# Patient Record
Sex: MALE | Race: WHITE | Employment: FULL TIME | URBAN - METROPOLITAN AREA
[De-identification: names, ages, dates, MRNs, and addresses within clinical notes are randomized per-mention and may not be internally consistent; named-entity substitution may affect disease eponyms.]

---

## 2019-04-15 ENCOUNTER — APPOINTMENT (OUTPATIENT)
Dept: GENERAL RADIOLOGY | Age: 59
End: 2019-04-15

## 2019-04-15 ENCOUNTER — HOSPITAL ENCOUNTER (EMERGENCY)
Age: 59
Discharge: HOME | End: 2019-04-15

## 2019-04-15 VITALS
TEMPERATURE: 97.9 F | HEIGHT: 72 IN | BODY MASS INDEX: 27.09 KG/M2 | RESPIRATION RATE: 16 BRPM | DIASTOLIC BLOOD PRESSURE: 81 MMHG | OXYGEN SATURATION: 100 % | SYSTOLIC BLOOD PRESSURE: 140 MMHG | WEIGHT: 200 LBS | HEART RATE: 82 BPM

## 2019-04-15 DIAGNOSIS — S89.92XA INJURY OF LEFT KNEE, INITIAL ENCOUNTER: ICD-10-CM

## 2019-04-15 DIAGNOSIS — M25.462 KNEE EFFUSION, LEFT: Primary | ICD-10-CM

## 2019-04-15 PROCEDURE — 99283 EMERGENCY DEPT VISIT LOW MDM: CPT

## 2019-04-15 PROCEDURE — 73562 X-RAY EXAM OF KNEE 3: CPT

## 2019-04-15 RX ORDER — IBUPROFEN 600 MG/1
600 TABLET ORAL EVERY 6 HOURS PRN
Qty: 60 TABLET | Refills: 0 | Status: SHIPPED | OUTPATIENT
Start: 2019-04-15

## 2019-04-15 ASSESSMENT — PAIN DESCRIPTION - LOCATION: LOCATION: KNEE

## 2019-04-15 ASSESSMENT — PAIN SCALES - GENERAL: PAINLEVEL_OUTOF10: 10

## 2019-04-15 ASSESSMENT — PAIN DESCRIPTION - ORIENTATION: ORIENTATION: LEFT

## 2019-04-15 ASSESSMENT — PAIN DESCRIPTION - PAIN TYPE: TYPE: ACUTE PAIN

## 2019-04-15 NOTE — ED PROVIDER NOTES
eMERGENCY dEPARTMENT eNCOUnter      PCP: No primary care provider on file. CHIEF COMPLAINT    Chief Complaint   Patient presents with    Knee Pain     hit on metal saturday, pain since. left       HPI    Chico Connors is a 64 y.o. male who presents with Left knee pain. Onset was saturday. The context is he reports a piece of metal fell on it Saturday and then he fell landing on his knee. Denies any other injuries. Pain is localized to left knee. The pain severity is 10/10. The patient has no associated other injuries. The pain is aggravated by ambulation and knee movement. REVIEW OF SYSTEMS    General: Denies fever or chills  Cardiac: Denies chest pain  Pulmonary: Denies shortness of breath  GI: Denies abdominal pain, vomiting, or diarrhea  : No dysuria or hematuria    Denies any other muscles skeletal injuries, including chest wall and back. All other review of systems are negative  See HPI and nursing notes for additional information     [address]    History reviewed. No pertinent past medical history. History reviewed. No pertinent surgical history.     CURRENT MEDICATIONS        ALLERGIES    No Known Allergies    SOCIAL & FAMILY HISTORY    Social History     Socioeconomic History    Marital status:      Spouse name: None    Number of children: None    Years of education: None    Highest education level: None   Occupational History    None   Social Needs    Financial resource strain: None    Food insecurity:     Worry: None     Inability: None    Transportation needs:     Medical: None     Non-medical: None   Tobacco Use    Smoking status: Current Every Day Smoker     Packs/day: 0.50     Types: Cigarettes    Smokeless tobacco: Never Used   Substance and Sexual Activity    Alcohol use: Not Currently    Drug use: Never    Sexual activity: None   Lifestyle    Physical activity:     Days per week: None     Minutes per session: None    Stress: None pm    COMPARISON:  None. HISTORY:  ORDERING SYSTEM PROVIDED HISTORY: hit on metal, pain since  TECHNOLOGIST PROVIDED HISTORY:  Reason for exam:->hit on metal, pain since  Ordering Physician Provided Reason for Exam: hit on metal, pain since  Acuity: Acute  Type of Exam: Initial  Mechanism of Injury: pt states after he injured his knee he fell on it    FINDINGS:  No acute fracture or dislocation is identified.  Prominent chondrocalcinosis  is seen within all 3 compartments.  That said, significant degenerative  disease is not identified. [place] is a moderate to large joint effusion. Vascular calcifications noted. ED COURSE & MEDICAL DECISION MAKING       Vital signs and nursing notes reviewed during ED course. I have independently evaluated this patient . Supervising physican present in the Emergency Department, available for consultation, throughout entirety of  patient care. All pertinent Lab data and radiographic results reviewed with patient at bedside. The patient and/or the family were informed of the treatment plan, and time was allotted to answer questions. Disposition and plan discussed at bedside with patient and/or the family today. History and exam is consistent with joint effusion. I discussed the possibility of internal derangement. Recommended rest, ice, elevation    He'll place an Ace wrap and given crutches for use. Recommend follow-up with orthopedics in the next week. Diagnosis and plan discussed in detail with patient who understands and agrees. Patient agrees to return emergency department if symptoms worsen or any new symptoms develop. Differential diagnosis: includes but not limited to ligamentous injury, tendon injury, soft tissue contusion/hematoma, fracture, dislocation, Infection, Neurologic Deficit/Injury, Meniscus tear, ACL tear, tibial plateau fracture, extensor mechanism rupture, dislocation, Arterial Injury/Ischemia.         Clinical  IMPRESSION    1. Knee effusion, left    2. Injury of left knee, initial encounter            Comment: Please note this report has been produced using speech recognition software and may contain errors related to that system including errors in grammar, punctuation, and spelling, as well as words and phrases that may be inappropriate. If there are any questions or concerns please feel free to contact the dictating provider for clarification.      AMADO Mandujano - KAVON  04/15/19 7071

## 2019-04-15 NOTE — LETTER
[facility] Emergency Department  [address] [id number]  Phone: [phone]  Fax: [phone]               April 15, 2019    Patient: Bereket Belcher   YOB: 1960   Date of Visit: 4/15/2019       To Whom It May Concern:    Дмитрий Morales was seen and treated in our emergency department on 4/15/2019. He may return to work on 4/19/2019.       Sincerely,       AMADO Serrato CNP         Signature:__________________________________

## 2023-01-23 ENCOUNTER — APPOINTMENT (OUTPATIENT)
Dept: CT IMAGING | Age: 63
DRG: 639 | End: 2023-01-23

## 2023-01-23 ENCOUNTER — APPOINTMENT (OUTPATIENT)
Dept: GENERAL RADIOLOGY | Age: 63
DRG: 639 | End: 2023-01-23

## 2023-01-23 ENCOUNTER — HOSPITAL ENCOUNTER (INPATIENT)
Age: 63
LOS: 3 days | Discharge: HOME | DRG: 639 | End: 2023-01-26
Attending: EMERGENCY MEDICINE | Admitting: STUDENT IN AN ORGANIZED HEALTH CARE EDUCATION/TRAINING PROGRAM

## 2023-01-23 DIAGNOSIS — E13.10 DIABETIC KETOSIS (HCC): ICD-10-CM

## 2023-01-23 DIAGNOSIS — E16.2 HYPOGLYCEMIA: ICD-10-CM

## 2023-01-23 DIAGNOSIS — E11.10 DIABETIC KETOACIDOSIS WITHOUT COMA ASSOCIATED WITH TYPE 2 DIABETES MELLITUS (HCC): ICD-10-CM

## 2023-01-23 DIAGNOSIS — E11.9 DIABETES MELLITUS, NEW ONSET (HCC): Primary | ICD-10-CM

## 2023-01-23 DIAGNOSIS — R29.898 LEFT ARM WEAKNESS: ICD-10-CM

## 2023-01-23 DIAGNOSIS — E10.10 DKA, TYPE 1, NOT AT GOAL (HCC): ICD-10-CM

## 2023-01-23 DIAGNOSIS — M62.838 SPASM OF MUSCLE: ICD-10-CM

## 2023-01-23 PROBLEM — R73.9 HYPERGLYCEMIA: Status: ACTIVE | Noted: 2023-01-23

## 2023-01-23 LAB
ALBUMIN SERPL-MCNC: 4.1 GM/DL (ref 3.4–5)
ALP BLD-CCNC: 114 IU/L (ref 40–129)
ALT SERPL-CCNC: 7 U/L (ref 10–40)
ANION GAP SERPL CALCULATED.3IONS-SCNC: 10 MMOL/L (ref 4–16)
ANION GAP SERPL CALCULATED.3IONS-SCNC: 10 MMOL/L (ref 4–16)
ANION GAP SERPL CALCULATED.3IONS-SCNC: 17 MMOL/L (ref 4–16)
AST SERPL-CCNC: 7 IU/L (ref 15–37)
BASE EXCESS: 4 (ref 0–3.3)
BASOPHILS ABSOLUTE: 0.1 K/CU MM
BASOPHILS RELATIVE PERCENT: 0.9 % (ref 0–1)
BETA-HYDROXYBUTYRATE: >20.8 MG/DL (ref 0–3)
BILIRUB SERPL-MCNC: 0.1 MG/DL (ref 0–1)
BUN BLDV-MCNC: 26 MG/DL (ref 6–23)
BUN BLDV-MCNC: 30 MG/DL (ref 6–23)
BUN BLDV-MCNC: 34 MG/DL (ref 6–23)
CALCIUM IONIZED: 4.6 MG/DL (ref 4.48–5.28)
CALCIUM SERPL-MCNC: 8.9 MG/DL (ref 8.3–10.6)
CALCIUM SERPL-MCNC: 9.3 MG/DL (ref 8.3–10.6)
CALCIUM SERPL-MCNC: 9.7 MG/DL (ref 8.3–10.6)
CHLORIDE BLD-SCNC: 102 MMOL/L (ref 99–110)
CHLORIDE BLD-SCNC: 90 MMOL/L (ref 99–110)
CHLORIDE BLD-SCNC: 98 MMOL/L (ref 99–110)
CO2: 18 MMOL/L (ref 21–32)
CO2: 25 MMOL/L (ref 21–32)
CO2: 27 MMOL/L (ref 21–32)
COMMENT: ABNORMAL
CREAT SERPL-MCNC: 1.1 MG/DL (ref 0.9–1.3)
CREAT SERPL-MCNC: 1.2 MG/DL (ref 0.9–1.3)
CREAT SERPL-MCNC: 1.2 MG/DL (ref 0.9–1.3)
DIFFERENTIAL TYPE: ABNORMAL
EKG ATRIAL RATE: 96 BPM
EKG DIAGNOSIS: NORMAL
EKG P AXIS: 37 DEGREES
EKG P-R INTERVAL: 166 MS
EKG Q-T INTERVAL: 346 MS
EKG QRS DURATION: 114 MS
EKG QTC CALCULATION (BAZETT): 437 MS
EKG R AXIS: 45 DEGREES
EKG T AXIS: 25 DEGREES
EKG VENTRICULAR RATE: 96 BPM
EOSINOPHILS ABSOLUTE: 0.1 K/CU MM
EOSINOPHILS RELATIVE PERCENT: 1 % (ref 0–3)
GFR SERPL CREATININE-BSD FRML MDRD: >60 ML/MIN/1.73M2
GLUCOSE BLD-MCNC: 223 MG/DL (ref 70–99)
GLUCOSE BLD-MCNC: 369 MG/DL (ref 70–99)
GLUCOSE BLD-MCNC: 455 MG/DL (ref 70–99)
GLUCOSE BLD-MCNC: 590 MG/DL (ref 70–99)
GLUCOSE BLD-MCNC: 71 MG/DL (ref 70–99)
GLUCOSE BLD-MCNC: 71 MG/DL (ref 70–99)
GLUCOSE BLD-MCNC: 878 MG/DL (ref 70–99)
GLUCOSE BLD-MCNC: >600 MG/DL (ref 70–99)
HCO3 VENOUS: 21.6 MMOL/L (ref 19–25)
HCT VFR BLD CALC: 43.9 % (ref 42–52)
HEMOGLOBIN: 15.3 GM/DL (ref 13.5–18)
IMMATURE NEUTROPHIL %: 1.2 % (ref 0–0.43)
IONIZED CA: 1.15 MMOL/L (ref 1.12–1.32)
LIPASE: 66 IU/L (ref 13–60)
LYMPHOCYTES ABSOLUTE: 1.4 K/CU MM
LYMPHOCYTES RELATIVE PERCENT: 11.5 % (ref 24–44)
MAGNESIUM: 2.2 MG/DL (ref 1.8–2.4)
MCH RBC QN AUTO: 33.3 PG (ref 27–31)
MCHC RBC AUTO-ENTMCNC: 34.9 % (ref 32–36)
MCV RBC AUTO: 95.6 FL (ref 78–100)
MONOCYTES ABSOLUTE: 1.4 K/CU MM
MONOCYTES RELATIVE PERCENT: 11.5 % (ref 0–4)
NUCLEATED RBC %: 0 %
O2 SAT, VEN: 91 % (ref 50–70)
PCO2, VEN: 40 MMHG (ref 38–52)
PDW BLD-RTO: 13.2 % (ref 11.7–14.9)
PH VENOUS: 7.34 (ref 7.32–7.42)
PLATELET # BLD: 244 K/CU MM (ref 140–440)
PMV BLD AUTO: 12.8 FL (ref 7.5–11.1)
PO2, VEN: 140 MMHG (ref 28–48)
POTASSIUM SERPL-SCNC: 4.3 MMOL/L (ref 3.5–5.1)
POTASSIUM SERPL-SCNC: 4.6 MMOL/L (ref 3.5–5.1)
POTASSIUM SERPL-SCNC: 5 MMOL/L (ref 3.5–5.1)
RBC # BLD: 4.59 M/CU MM (ref 4.6–6.2)
REASON FOR REJECTION: NORMAL
REJECTED TEST: NORMAL
SEGMENTED NEUTROPHILS ABSOLUTE COUNT: 8.8 K/CU MM
SEGMENTED NEUTROPHILS RELATIVE PERCENT: 73.9 % (ref 36–66)
SODIUM BLD-SCNC: 125 MMOL/L (ref 135–145)
SODIUM BLD-SCNC: 133 MMOL/L (ref 135–145)
SODIUM BLD-SCNC: 139 MMOL/L (ref 135–145)
TOTAL CK: 61 IU/L (ref 38–174)
TOTAL IMMATURE NEUTOROPHIL: 0.14 K/CU MM
TOTAL NUCLEATED RBC: 0 K/CU MM
TOTAL PROTEIN: 6.7 GM/DL (ref 6.4–8.2)
TROPONIN T: <0.01 NG/ML
TROPONIN T: <0.01 NG/ML
TSH HIGH SENSITIVITY: 1.52 UIU/ML (ref 0.27–4.2)
WBC # BLD: 11.9 K/CU MM (ref 4–10.5)

## 2023-01-23 PROCEDURE — 72125 CT NECK SPINE W/O DYE: CPT

## 2023-01-23 PROCEDURE — 6360000002 HC RX W HCPCS: Performed by: STUDENT IN AN ORGANIZED HEALTH CARE EDUCATION/TRAINING PROGRAM

## 2023-01-23 PROCEDURE — 83036 HEMOGLOBIN GLYCOSYLATED A1C: CPT

## 2023-01-23 PROCEDURE — 84443 ASSAY THYROID STIM HORMONE: CPT

## 2023-01-23 PROCEDURE — 71045 X-RAY EXAM CHEST 1 VIEW: CPT

## 2023-01-23 PROCEDURE — 6360000002 HC RX W HCPCS: Performed by: EMERGENCY MEDICINE

## 2023-01-23 PROCEDURE — 96372 THER/PROPH/DIAG INJ SC/IM: CPT

## 2023-01-23 PROCEDURE — 80053 COMPREHEN METABOLIC PANEL: CPT

## 2023-01-23 PROCEDURE — 99285 EMERGENCY DEPT VISIT HI MDM: CPT

## 2023-01-23 PROCEDURE — 6370000000 HC RX 637 (ALT 250 FOR IP): Performed by: STUDENT IN AN ORGANIZED HEALTH CARE EDUCATION/TRAINING PROGRAM

## 2023-01-23 PROCEDURE — 96374 THER/PROPH/DIAG INJ IV PUSH: CPT

## 2023-01-23 PROCEDURE — 6370000000 HC RX 637 (ALT 250 FOR IP): Performed by: EMERGENCY MEDICINE

## 2023-01-23 PROCEDURE — 84484 ASSAY OF TROPONIN QUANT: CPT

## 2023-01-23 PROCEDURE — 85025 COMPLETE CBC W/AUTO DIFF WBC: CPT

## 2023-01-23 PROCEDURE — 70498 CT ANGIOGRAPHY NECK: CPT

## 2023-01-23 PROCEDURE — 83735 ASSAY OF MAGNESIUM: CPT

## 2023-01-23 PROCEDURE — 6360000004 HC RX CONTRAST MEDICATION

## 2023-01-23 PROCEDURE — 82010 KETONE BODYS QUAN: CPT

## 2023-01-23 PROCEDURE — 93010 ELECTROCARDIOGRAM REPORT: CPT | Performed by: INTERNAL MEDICINE

## 2023-01-23 PROCEDURE — 6370000000 HC RX 637 (ALT 250 FOR IP): Performed by: INTERNAL MEDICINE

## 2023-01-23 PROCEDURE — 83690 ASSAY OF LIPASE: CPT

## 2023-01-23 PROCEDURE — 93005 ELECTROCARDIOGRAM TRACING: CPT | Performed by: EMERGENCY MEDICINE

## 2023-01-23 PROCEDURE — 2580000003 HC RX 258: Performed by: EMERGENCY MEDICINE

## 2023-01-23 PROCEDURE — 36415 COLL VENOUS BLD VENIPUNCTURE: CPT

## 2023-01-23 PROCEDURE — 96375 TX/PRO/DX INJ NEW DRUG ADDON: CPT

## 2023-01-23 PROCEDURE — 87040 BLOOD CULTURE FOR BACTERIA: CPT

## 2023-01-23 PROCEDURE — 2580000003 HC RX 258: Performed by: STUDENT IN AN ORGANIZED HEALTH CARE EDUCATION/TRAINING PROGRAM

## 2023-01-23 PROCEDURE — 82962 GLUCOSE BLOOD TEST: CPT

## 2023-01-23 PROCEDURE — 70450 CT HEAD/BRAIN W/O DYE: CPT

## 2023-01-23 PROCEDURE — 80048 BASIC METABOLIC PNL TOTAL CA: CPT

## 2023-01-23 PROCEDURE — 82330 ASSAY OF CALCIUM: CPT

## 2023-01-23 PROCEDURE — 82550 ASSAY OF CK (CPK): CPT

## 2023-01-23 PROCEDURE — 82805 BLOOD GASES W/O2 SATURATION: CPT

## 2023-01-23 PROCEDURE — 2000000000 HC ICU R&B

## 2023-01-23 RX ORDER — SODIUM CHLORIDE 9 MG/ML
INJECTION, SOLUTION INTRAVENOUS PRN
Status: DISCONTINUED | OUTPATIENT
Start: 2023-01-23 | End: 2023-01-26 | Stop reason: HOSPADM

## 2023-01-23 RX ORDER — INSULIN LISPRO 100 [IU]/ML
0-8 INJECTION, SOLUTION INTRAVENOUS; SUBCUTANEOUS
Status: DISCONTINUED | OUTPATIENT
Start: 2023-01-23 | End: 2023-01-26 | Stop reason: HOSPADM

## 2023-01-23 RX ORDER — TIZANIDINE 4 MG/1
4 TABLET ORAL EVERY 6 HOURS PRN
Status: DISCONTINUED | OUTPATIENT
Start: 2023-01-23 | End: 2023-01-26 | Stop reason: HOSPADM

## 2023-01-23 RX ORDER — POLYETHYLENE GLYCOL 3350 17 G/17G
17 POWDER, FOR SOLUTION ORAL DAILY PRN
Status: CANCELLED | OUTPATIENT
Start: 2023-01-23

## 2023-01-23 RX ORDER — INSULIN LISPRO 100 [IU]/ML
0-4 INJECTION, SOLUTION INTRAVENOUS; SUBCUTANEOUS
Status: DISCONTINUED | OUTPATIENT
Start: 2023-01-23 | End: 2023-01-23

## 2023-01-23 RX ORDER — ENOXAPARIN SODIUM 100 MG/ML
40 INJECTION SUBCUTANEOUS DAILY
Status: DISCONTINUED | OUTPATIENT
Start: 2023-01-23 | End: 2023-01-26 | Stop reason: HOSPADM

## 2023-01-23 RX ORDER — 0.9 % SODIUM CHLORIDE 0.9 %
1000 INTRAVENOUS SOLUTION INTRAVENOUS ONCE
Status: COMPLETED | OUTPATIENT
Start: 2023-01-23 | End: 2023-01-23

## 2023-01-23 RX ORDER — ACETAMINOPHEN 325 MG/1
650 TABLET ORAL EVERY 6 HOURS PRN
Status: CANCELLED | OUTPATIENT
Start: 2023-01-23

## 2023-01-23 RX ORDER — ACETAMINOPHEN 325 MG/1
650 TABLET ORAL EVERY 6 HOURS PRN
Status: DISCONTINUED | OUTPATIENT
Start: 2023-01-23 | End: 2023-01-26 | Stop reason: HOSPADM

## 2023-01-23 RX ORDER — INSULIN GLARGINE 100 [IU]/ML
40 INJECTION, SOLUTION SUBCUTANEOUS NIGHTLY
Status: DISCONTINUED | OUTPATIENT
Start: 2023-01-23 | End: 2023-01-24

## 2023-01-23 RX ORDER — INSULIN LISPRO 100 [IU]/ML
0-4 INJECTION, SOLUTION INTRAVENOUS; SUBCUTANEOUS NIGHTLY
Status: DISCONTINUED | OUTPATIENT
Start: 2023-01-23 | End: 2023-01-23

## 2023-01-23 RX ORDER — MAGNESIUM SULFATE IN WATER 40 MG/ML
2000 INJECTION, SOLUTION INTRAVENOUS PRN
Status: DISCONTINUED | OUTPATIENT
Start: 2023-01-23 | End: 2023-01-26 | Stop reason: HOSPADM

## 2023-01-23 RX ORDER — ACETAMINOPHEN 650 MG/1
650 SUPPOSITORY RECTAL EVERY 6 HOURS PRN
Status: DISCONTINUED | OUTPATIENT
Start: 2023-01-23 | End: 2023-01-26 | Stop reason: HOSPADM

## 2023-01-23 RX ORDER — NAPROXEN SODIUM 220 MG
220 TABLET ORAL EVERY MORNING
Status: ON HOLD | COMMUNITY
End: 2023-01-25 | Stop reason: HOSPADM

## 2023-01-23 RX ORDER — SODIUM CHLORIDE 0.9 % (FLUSH) 0.9 %
5-40 SYRINGE (ML) INJECTION PRN
Status: DISCONTINUED | OUTPATIENT
Start: 2023-01-23 | End: 2023-01-26 | Stop reason: HOSPADM

## 2023-01-23 RX ORDER — SODIUM CHLORIDE 9 MG/ML
INJECTION, SOLUTION INTRAVENOUS PRN
Status: CANCELLED | OUTPATIENT
Start: 2023-01-23

## 2023-01-23 RX ORDER — LORAZEPAM 2 MG/ML
0.5 INJECTION INTRAMUSCULAR ONCE
Status: COMPLETED | OUTPATIENT
Start: 2023-01-23 | End: 2023-01-23

## 2023-01-23 RX ORDER — NICOTINE 21 MG/24HR
1 PATCH, TRANSDERMAL 24 HOURS TRANSDERMAL DAILY
Status: DISCONTINUED | OUTPATIENT
Start: 2023-01-23 | End: 2023-01-26 | Stop reason: HOSPADM

## 2023-01-23 RX ORDER — ONDANSETRON 2 MG/ML
4 INJECTION INTRAMUSCULAR; INTRAVENOUS EVERY 6 HOURS PRN
Status: CANCELLED | OUTPATIENT
Start: 2023-01-23

## 2023-01-23 RX ORDER — INSULIN LISPRO 100 [IU]/ML
15 INJECTION, SOLUTION INTRAVENOUS; SUBCUTANEOUS
Status: DISCONTINUED | OUTPATIENT
Start: 2023-01-23 | End: 2023-01-24

## 2023-01-23 RX ORDER — ACETAMINOPHEN 650 MG/1
650 SUPPOSITORY RECTAL EVERY 6 HOURS PRN
Status: CANCELLED | OUTPATIENT
Start: 2023-01-23

## 2023-01-23 RX ORDER — SODIUM CHLORIDE 0.9 % (FLUSH) 0.9 %
5-40 SYRINGE (ML) INJECTION PRN
Status: CANCELLED | OUTPATIENT
Start: 2023-01-23

## 2023-01-23 RX ORDER — POTASSIUM CHLORIDE 7.45 MG/ML
10 INJECTION INTRAVENOUS PRN
Status: DISCONTINUED | OUTPATIENT
Start: 2023-01-23 | End: 2023-01-26 | Stop reason: HOSPADM

## 2023-01-23 RX ORDER — ONDANSETRON 4 MG/1
4 TABLET, ORALLY DISINTEGRATING ORAL EVERY 8 HOURS PRN
Status: CANCELLED | OUTPATIENT
Start: 2023-01-23

## 2023-01-23 RX ORDER — SODIUM CHLORIDE 0.9 % (FLUSH) 0.9 %
5-40 SYRINGE (ML) INJECTION EVERY 12 HOURS SCHEDULED
Status: DISCONTINUED | OUTPATIENT
Start: 2023-01-23 | End: 2023-01-26 | Stop reason: HOSPADM

## 2023-01-23 RX ORDER — POTASSIUM CHLORIDE 20 MEQ/1
40 TABLET, EXTENDED RELEASE ORAL PRN
Status: DISCONTINUED | OUTPATIENT
Start: 2023-01-23 | End: 2023-01-26 | Stop reason: HOSPADM

## 2023-01-23 RX ORDER — SODIUM CHLORIDE 9 MG/ML
INJECTION, SOLUTION INTRAVENOUS CONTINUOUS
Status: DISCONTINUED | OUTPATIENT
Start: 2023-01-23 | End: 2023-01-25

## 2023-01-23 RX ORDER — SODIUM CHLORIDE 0.9 % (FLUSH) 0.9 %
5-40 SYRINGE (ML) INJECTION EVERY 12 HOURS SCHEDULED
Status: CANCELLED | OUTPATIENT
Start: 2023-01-23

## 2023-01-23 RX ORDER — NICOTINE 21 MG/24HR
1 PATCH, TRANSDERMAL 24 HOURS TRANSDERMAL DAILY
Status: DISCONTINUED | OUTPATIENT
Start: 2023-01-23 | End: 2023-01-23 | Stop reason: SDUPTHER

## 2023-01-23 RX ORDER — ENOXAPARIN SODIUM 100 MG/ML
40 INJECTION SUBCUTANEOUS DAILY
Status: CANCELLED | OUTPATIENT
Start: 2023-01-23

## 2023-01-23 RX ADMIN — ENOXAPARIN SODIUM 40 MG: 100 INJECTION SUBCUTANEOUS at 14:34

## 2023-01-23 RX ADMIN — LORAZEPAM 0.5 MG: 2 INJECTION INTRAMUSCULAR; INTRAVENOUS at 05:43

## 2023-01-23 RX ADMIN — SODIUM CHLORIDE: 9 INJECTION, SOLUTION INTRAVENOUS at 22:36

## 2023-01-23 RX ADMIN — INSULIN LISPRO 2 UNITS: 100 INJECTION, SOLUTION INTRAVENOUS; SUBCUTANEOUS at 17:41

## 2023-01-23 RX ADMIN — INSULIN LISPRO 8 UNITS: 100 INJECTION, SOLUTION INTRAVENOUS; SUBCUTANEOUS at 14:34

## 2023-01-23 RX ADMIN — IOPAMIDOL 75 ML: 755 INJECTION, SOLUTION INTRAVENOUS at 10:46

## 2023-01-23 RX ADMIN — INSULIN HUMAN 10 UNITS: 100 INJECTION, SUSPENSION SUBCUTANEOUS at 14:41

## 2023-01-23 RX ADMIN — SODIUM CHLORIDE 1000 ML: 9 INJECTION, SOLUTION INTRAVENOUS at 09:15

## 2023-01-23 RX ADMIN — Medication 10 ML: at 22:18

## 2023-01-23 RX ADMIN — INSULIN LISPRO 15 UNITS: 100 INJECTION, SOLUTION INTRAVENOUS; SUBCUTANEOUS at 17:41

## 2023-01-23 RX ADMIN — INSULIN HUMAN 5 UNITS: 100 INJECTION, SOLUTION PARENTERAL at 10:01

## 2023-01-23 RX ADMIN — Medication 5 ML: at 12:07

## 2023-01-23 RX ADMIN — INSULIN HUMAN 10 UNITS: 100 INJECTION, SOLUTION PARENTERAL at 10:00

## 2023-01-23 RX ADMIN — SODIUM CHLORIDE: 9 INJECTION, SOLUTION INTRAVENOUS at 14:28

## 2023-01-23 RX ADMIN — INSULIN LISPRO 15 UNITS: 100 INJECTION, SOLUTION INTRAVENOUS; SUBCUTANEOUS at 14:34

## 2023-01-23 ASSESSMENT — LIFESTYLE VARIABLES
HOW OFTEN DO YOU HAVE A DRINK CONTAINING ALCOHOL: NEVER
HOW MANY STANDARD DRINKS CONTAINING ALCOHOL DO YOU HAVE ON A TYPICAL DAY: PATIENT DOES NOT DRINK

## 2023-01-23 ASSESSMENT — ENCOUNTER SYMPTOMS
WHEEZING: 0
NAUSEA: 0
DIARRHEA: 0
SHORTNESS OF BREATH: 0
CONSTIPATION: 0
RHINORRHEA: 0
VOMITING: 0
SORE THROAT: 0
SINUS PRESSURE: 0
ABDOMINAL PAIN: 0
COUGH: 0

## 2023-01-23 ASSESSMENT — PAIN SCALES - GENERAL
PAINLEVEL_OUTOF10: 6
PAINLEVEL_OUTOF10: 0

## 2023-01-23 NOTE — ED NOTES
Report given to [facility] RN and care transferred at this time.       Chato Lam RN  01/23/23 9608

## 2023-01-23 NOTE — ED NOTES
BMP result called to ICU. Intensive care provider does not want pt coming to ICU. Dr. Neema Ramos notified of order change.       Marianna Canales RN  01/23/23 1920

## 2023-01-23 NOTE — H&P
V2.0  History and Physical      Name:  Steven Eckert /Age/Sex: 1960  (63 y.o. male)   MRN & CSN:  [id number] & [id number] Encounter Date/Time: 2023 11:29 AM EST   Location:  ED28/ED-28 [facility] [address] [facility]    Assessment and Plan:   Steven Eckert is a 63 y.o. male with a pmh of  who presents with DKA, type 1, not at goal [facility])    Hospital Problems             Last Modified POA    * (Principal) DKA, type 1, not at goal [facility]) 2023 Yes    Hyperglycemia 2023 Yes       Left arm twitching/spasm/jerking:  Left hand with decreased  strength and numbness:  -No clear previous history or inciting cause  -We will follow infectious work-up  -Neurochecks  -Telemetry  -CT head/CT C-spine/CTA as below  -Neurology consult  -TSH, A1c, lipid panel in a.m. New onset diabetes mellitus with hyperglycemia:  -Patient initial presentation concerning for DKA, however anion gap/bicarb/hyperglycemia improved status post IV fluids. Case discussed with ICU attending and deemed stable for medicine for  -Continue IV NS at 125 mL/h  -A1c  -Endocrine consult  -We will repeat ketones to ensure improvement  -We will repeat BMP to avoid hypokalemia  Risk creatinine level:  -Creatinine 1.2 on admission normal  -Improving status post IV fluids  -Monitor BMP and avoid nephrotoxic medications    Disposition:   Current Living situation: Home w brother  Expected Disposition: Home  Estimated D/C: 2-3 days    Diet ADULT DIET; Regular; 4 carb choices (60 gm/meal)   DVT Prophylaxis [x] Lovenox, []  Heparin, [] SCDs, [] Ambulation,  [] Eliquis, [] Xarelto, [] Coumadin   Code Status Full Code   Surrogate Decision Maker/ POA Self     History from:     patient    History of Present Illness:     Chief Complaint: Left arm twitching, spasms.   Steven Eckert is a 63 y.o. male with no significant past medical history pmh, last follow-up with PCP 2 years back who presents with left arm twitching and spasm for 1 day. Patient reports to be in usual state of health and started experiencing random left arm/hand spasms for 1 day. Patient is also noticed some left hand decreased  strength and numbness for the same duration. Patient denies any previous similar episodes, no chest pain or shortness of breath, no problem with vision/no headaches/no gait issues/no bowel/bladder/appetite or weight changes/no fever chills/sick contacts or new medications. Vital signs stable in the ED. Initial labs with sodium 125, chloride 90, BUN 34/creatinine 1.2, bicarb 18, anion gap 17, glucose 878, lipase 66, beta hydroxybutyrate >20, leukocytosis 11.9k, VBG with pH 7.34/PCO2 40, bicarb 21, CT head unremarkable, CT C-spine with degenerative changes, CTA with1. Severe narrowing at the right vertebral artery origin and mild narrowing at the left vertebral artery origin. 2. Fibrocalcific plaque is noted in bilateral carotid bulbs and proximal internal carotid arteries without evidence of a flow-limiting stenosis. 3. Unremarkable CTA of the brain. 4. Dental caries with periodontal disease. Patient received 1 L normal saline bolus, insulin R 15 units in total, Ativan 0.5 mg.  Repeat BMP with sodium 133, bicarb improved to 25 from 18, creatinine improved to 1.1 from 1.2 and glucose improved to 590 from 878, anion gap resolved 10 from 17. Case was discussed with ICU attending and deemed not an ICU candidate/use for insulin drip at this time. Review of Systems: Need 10 Elements   Review of Systems    Review of Systems:   Constitutional: Negative. Negative for activity change, appetite change, chills, diaphoresis, fatigue, fever and unexpected weight change. HENT: Negative.   Negative for congestion, dental problem, drooling, ear discharge, ear pain, facial swelling, hearing loss, mouth sores, nosebleeds, postnasal drip, rhinorrhea, sinus pressure, sinus pain, sneezing, sore throat, tinnitus, trouble swallowing and voice change. Eyes: Negative. Negative for photophobia, pain, discharge, redness, itching and visual disturbance. Respiratory: Negative. Negative for apnea, cough, choking, chest tightness, shortness of breath, wheezing and stridor. Cardiovascular: Negative. Negative for chest pain and palpitations. Gastrointestinal: Negative. Negative for abdominal distention, abdominal pain, anal bleeding, blood in stool, constipation, diarrhea, nausea, rectal pain and vomiting. Endocrine: Negative. Negative for cold intolerance, heat intolerance, polydipsia, polyphagia and polyuria. Genitourinary: Negative. Negative for decreased urine volume, difficulty urinating, dysuria, enuresis, flank pain, frequency, genital sores, hematuria, penile discharge, penile pain, penile swelling, scrotal swelling, testicular pain and urgency. Musculoskeletal: Negative. Negative for arthralgias, back pain, gait problem, joint swelling, myalgias, neck pain and neck stiffness. Skin: Negative. Negative for color change, pallor, rash and wound. Allergic/Immunologic: Negative for environmental allergies, food allergies and immunocompromised state. Neurological: Left arm spasm, twitching, jerking left weakness and numbness  Hematological: Negative. Negative for adenopathy. Does not bruise/bleed easily. Psychiatric/Behavioral: Negative. Negative for agitation, behavioral problems, confusion, decreased concentration, dysphoric mood, hallucinations, self-injury, sleep disturbance and suicidal ideas. The patient is not nervous/anxious and is not hyperactive.          Objective:   No intake or output data in the 24 hours ending 01/23/23 1129   Vitals:   Vitals:    01/23/23 0603 01/23/23 0733 01/23/23 0901 01/23/23 1003   BP: 103/72 123/77 (!) 136/108 (!) 158/92   Pulse: 92 83 80 72   Resp: 18 27 13 20   Temp:   98.2 °F (36.8 °C)    SpO2: 95% 95% 97% 96%       Medications Prior to Admission     Prior to Admission medications Medication Sig Start Date End Date Taking? Authorizing Provider   naproxen sodium (ALEVE) 220 MG tablet Take 220 mg by mouth every morning   Yes Historical Provider, MD       Physical Exam: Need 8 Elements   Physical Exam     General: NAD  Eyes: EOMI  ENT: neck supple  Cardiovascular: Regular rate. Respiratory: Clear to auscultation  Gastrointestinal: Soft, non tender  Genitourinary: no suprapubic tenderness  Musculoskeletal: No edema  Skin: warm, dry  Neuro: Alert. Psych: Mood appropriate. Past Medical History:   PMHx History reviewed. No pertinent past medical history. PSHX:  has no past surgical history on file. Allergies: No Known Allergies  Fam HX:  family history is not on file.   Soc HX:   Social History     Socioeconomic History    Marital status:      Spouse name: None    Number of children: None    Years of education: None    Highest education level: None   Tobacco Use    Smoking status: Every Day     Packs/day: 0.50     Types: Cigarettes    Smokeless tobacco: Never   Substance and Sexual Activity    Alcohol use: Not Currently    Drug use: Never       Medications:   Medications:    nicotine  1 patch TransDERmal Daily    sodium chloride flush  5-40 mL IntraVENous 2 times per day    enoxaparin  40 mg SubCUTAneous Daily    nicotine  1 patch TransDERmal Daily      Infusions:    sodium chloride       PRN Meds: sodium chloride flush, 5-40 mL, PRN  sodium chloride, , PRN  acetaminophen, 650 mg, Q6H PRN   Or  acetaminophen, 650 mg, Q6H PRN  potassium chloride, 40 mEq, PRN   Or  potassium alternative oral replacement, 40 mEq, PRN   Or  potassium chloride, 10 mEq, PRN  magnesium sulfate, 2,000 mg, PRN  senna, 5 mL, BID PRN        Labs      CBC:   Recent Labs     01/23/23  0532   WBC 11.9*   HGB 15.3        BMP:    Recent Labs     01/23/23  0532 01/23/23  1034   * 133*   K 5.0 4.6   CL 90* 98*   CO2 18* 25   BUN 34* 30*   CREATININE 1.2 1.1   GLUCOSE 878* 590*     Hepatic:   Recent Labs 01/23/23  0532   AST 7*   ALT 7*   BILITOT 0.1   ALKPHOS 114     Lipids: No results found for: CHOL, HDL, TRIG  Hemoglobin A1C: No results found for: LABA1C  TSH: No results found for: TSH  Troponin:   Lab Results   Component Value Date/Time    TROPONINT <0.010 01/23/2023 05:32 AM     Lactic Acid: No results for input(s): LACTA in the last 72 hours. BNP: No results for input(s): PROBNP in the last 72 hours. UA:No results found for: Marzella Handing, PHUR, LABCAST, WBCUA, RBCUA, MUCUS, TRICHOMONAS, YEAST, BACTERIA, CLARITYU, SPECGRAV, LEUKOCYTESUR, UROBILINOGEN, BILIRUBINUR, BLOODU, GLUCOSEU, KETUA, AMORPHOUS  Urine Cultures: No results found for: LABURIN  Blood Cultures: No results found for: BC  No results found for: BLOODCULT2  Organism: No results found for: ORG    Imaging/Diagnostics Last 24 Hours   CT HEAD WO CONTRAST    Result Date: 1/23/2023  EXAMINATION: CT OF THE HEAD WITHOUT CONTRAST; CT OF THE CERVICAL SPINE WITHOUT CONTRAST 1/23/2023 6:04 am TECHNIQUE: CT of the head was performed without the administration of intravenous contrast. Automated exposure control, iterative reconstruction, and/or weight based adjustment of the mA/kV was utilized to reduce the radiation dose to as low as reasonably achievable.; CT of the cervical spine was performed without the administration of intravenous contrast. Multiplanar reformatted images are provided for review. Automated exposure control, iterative reconstruction, and/or weight based adjustment of the mA/kV was utilized to reduce the radiation dose to as low as reasonably achievable. COMPARISON: None. HISTORY: ORDERING SYSTEM PROVIDED HISTORY: left arm spasm TECHNOLOGIST PROVIDED HISTORY: Reason for exam:->left arm spasm Has a \"code stroke\" or \"stroke alert\" been called? ->No Decision Support Exception - unselect if not a suspected or confirmed emergency medical condition->Emergency Medical Condition (MA) Reason for Exam: left arm spasm FINDINGS: BRAIN/VENTRICLES: There is no acute intracranial hemorrhage, mass effect or midline shift. No abnormal extra-axial fluid collection. The gray-white differentiation is maintained without evidence of an acute infarct. There is no evidence of hydrocephalus. ORBITS: The visualized portion of the orbits demonstrate no acute abnormality. SINUSES: The visualized paranasal sinuses and mastoid air cells demonstrate no acute abnormality. SOFT TISSUES/SKULL:  No acute abnormality of the visualized skull or soft tissues. CT cervical spine: There is degenerative change in the cervical spine at multiple levels with decreased disc space height, osteophytosis, and posterior spondylitic ridging. This is most pronounced at C4-5, C5-6, and C6-7. No acute fracture or dislocation is seen. No acute intracranial abnormality. Degenerative change in the cervical spine     CT CERVICAL SPINE WO CONTRAST    Result Date: 1/23/2023  EXAMINATION: CT OF THE HEAD WITHOUT CONTRAST; CT OF THE CERVICAL SPINE WITHOUT CONTRAST 1/23/2023 6:04 am TECHNIQUE: CT of the head was performed without the administration of intravenous contrast. Automated exposure control, iterative reconstruction, and/or weight based adjustment of the mA/kV was utilized to reduce the radiation dose to as low as reasonably achievable.; CT of the cervical spine was performed without the administration of intravenous contrast. Multiplanar reformatted images are provided for review. Automated exposure control, iterative reconstruction, and/or weight based adjustment of the mA/kV was utilized to reduce the radiation dose to as low as reasonably achievable. COMPARISON: None. HISTORY: ORDERING SYSTEM PROVIDED HISTORY: left arm spasm TECHNOLOGIST PROVIDED HISTORY: Reason for exam:->left arm spasm Has a \"code stroke\" or \"stroke alert\" been called? ->No Decision Support Exception - unselect if not a suspected or confirmed emergency medical condition->Emergency Medical Condition (MA) Reason for Exam: left arm spasm FINDINGS: BRAIN/VENTRICLES: There is no acute intracranial hemorrhage, mass effect or midline shift. No abnormal extra-axial fluid collection. The gray-white differentiation is maintained without evidence of an acute infarct. There is no evidence of hydrocephalus. ORBITS: The visualized portion of the orbits demonstrate no acute abnormality. SINUSES: The visualized paranasal sinuses and mastoid air cells demonstrate no acute abnormality. SOFT TISSUES/SKULL:  No acute abnormality of the visualized skull or soft tissues. CT cervical spine: There is degenerative change in the cervical spine at multiple levels with decreased disc space height, osteophytosis, and posterior spondylitic ridging. This is most pronounced at C4-5, C5-6, and C6-7. No acute fracture or dislocation is seen. No acute intracranial abnormality. Degenerative change in the cervical spine     XR CHEST PORTABLE    Result Date: 1/23/2023  EXAMINATION: ONE XRAY VIEW OF THE CHEST 1/23/2023 5:56 am COMPARISON: None. HISTORY: ORDERING SYSTEM PROVIDED HISTORY: left arm pain TECHNOLOGIST PROVIDED HISTORY: Reason for exam:->left arm pain Reason for Exam: Spasms; Muscle Pain Additional signs and symptoms: left arm pain, spasms FINDINGS: There is no consolidation, pleural effusion, or pneumothorax. Calcified granulomas at the left apex and in both lower lungs. Cardiac silhouette is not enlarged and there is no pulmonary vascular congestion. Mediastinum and jersey are within normal limits. Bony thorax is unremarkable. No acute cardiopulmonary process. CTA HEAD NECK W CONTRAST    Result Date: 1/23/2023  EXAMINATION: CTA OF THE HEAD AND NECK WITH CONTRAST 1/23/2023 10:46 am: TECHNIQUE: CTA of the head and neck was performed with the administration of intravenous contrast. Multiplanar reformatted images are provided for review. MIP images are provided for review.  Stenosis of the internal carotid arteries measured using NASCET criteria. Automated exposure control, iterative reconstruction, and/or weight based adjustment of the mA/kV was utilized to reduce the radiation dose to as low as reasonably achievable. COMPARISON: Head CT on 01/23/2023. HISTORY: ORDERING SYSTEM PROVIDED HISTORY: left arm weakness TECHNOLOGIST PROVIDED HISTORY: Reason for exam:->left arm weakness Has a \"code stroke\" or \"stroke alert\" been called? ->No Decision Support Exception - unselect if not a suspected or confirmed emergency medical condition->Emergency Medical Condition (MA) Reason for Exam: left arm weakness Relevant Medical/Surgical History: [id number] FINDINGS: CTA NECK: AORTIC ARCH/ARCH VESSELS: Vascular calcifications are noted along the aortic arch. There is a normal branch pattern of the aortic arch. The innominate and subclavian arteries are patent without evidence of a flow-limiting stenosis. CAROTID ARTERIES: The right common carotid artery is normal in caliber. Fibrocalcific plaque is noted in the right carotid bulb and proximal right internal carotid artery. No flow-limiting stenosis or dissection. There is mild fibrofatty plaque in the left common carotid artery with areas of minimal narrowing. Fibrocalcific plaque is noted in the proximal left internal carotid artery without evidence of a flow-limiting stenosis. VERTEBRAL ARTERIES: There is severe narrowing at the right vertebral artery origin. Mild narrowing at the left vertebral artery origin. No dissection. SOFT TISSUES: There are calcified granulomas in the lungs. No superior mediastinal adenopathy. The thyroid gland is normal in appearance. Submandibular and parotid glands are unremarkable. The parapharyngeal fat spaces are preserved. No cervical or supraclavicular adenopathy. BONES: There are dental caries with periodontal disease. Degenerative changes are noted in the spine. No fracture. CTA HEAD: ANTERIOR CIRCULATION: Vascular calcifications are noted in the supraclinoid ICAs. The left A1 segment is hypoplastic. There is an azygous A2 segment. The middle cerebral arteries are unremarkable. POSTERIOR CIRCULATION: The P-comms are patent. The basilar artery is patent. There is a complete fetal origin of the left posterior cerebral artery. There is a near complete fetal origin of the right posterior cerebral artery. OTHER: No dural venous sinus thrombosis on this non-dedicated study. BRAIN: No areas of abnormal enhancement. No midline shift. 1. Severe narrowing at the right vertebral artery origin and mild narrowing at the left vertebral artery origin. 2. Fibrocalcific plaque is noted in bilateral carotid bulbs and proximal internal carotid arteries without evidence of a flow-limiting stenosis. 3. Unremarkable CTA of the brain. 4. Dental caries with periodontal disease.        Personally reviewed Lab Studies, Imaging    Electronically signed by Yoly Crump MD on 1/23/2023 at 11:29 AM

## 2023-01-23 NOTE — ED NOTES
Patient arrives for complaints of muscle spasms in the left arm that is causing his left hand to contract. Patient states this started Saturday and has increasing gotten worse through the last days. Patient states he only has numbness in the lower hand. Denies starting any new medications or any injuries.  Patient is alert and oriented with steady gait on arrival.     Carola Cervantes RN  01/23/23 5496

## 2023-01-23 NOTE — CONSULTS
Endocrinology   Consult Note  1/23/2023  5:17 AM     Primary Care provider: AMADO Quesada - KAVON     Referring physician:  Sangeeta Herring MD     Dear Doctor    Thank You for the Consult     Pt. Was Admitted for : Left arm twitching and spasms patient was hyperglycemic with possible DKA    Reason for Consult: Better control of blood glucose      History Obtained From:  Patient/ EMR       HISTORY OF PRESENT ILLNESS:                The patient is a 63 y.o. male with significant past medical history of has been in good health until recently when he developed jerky movement of the left arm. And he came to ER for evaluation was found to be hyperglycemic with a blood glucose of 800 and also has positive for beta hydroxybutyric acid and was thought to be DKA. But his anion gap was marginally abnormal.  He is not known to be diabetic. I was  consulted for better in recently diagnosed diabetes mellitus. ROS:   Pt's ROS done in detail. Abnormal ROS are noted in Medical and Surgical History Section below: Other Medical History:    History reviewed. No pertinent past medical history. Surgical History:    History reviewed. No pertinent surgical history. Allergies:  Patient has no known allergies. Family History:   History reviewed. No pertinent family history. REVIEW OF SYSTEMS:  Review of System Done as noted above     PHYSICAL EXAM:      Vitals:    /74   Pulse 66   Temp 97.7 °F (36.5 °C) (Oral)   Resp 16   Ht 6' (1.829 m)   SpO2 99%   BMI 27.12 kg/m²     CONSTITUTIONAL:  awake, alert, cooperative, appears stated age   EYES:  vision intact Fundoscopic Exam not performed   ENT:Normal  NECK:  Supple, No JVD.    Thyroid Exam:Normal   LUNGS:  Has Vesicular Breath Sounds,   CARDIOVASCULAR:  Normal apical impulse, regular rate and rhythm, normal S1 and S2, no S3 or S4, and has no  murmur   ABDOMEN:  No scars, normal bowel sounds, soft, non-distended, non-tender, no masses palpated, no hepatolienomegaly  Musculoskeletal: Normal  Extremities: Normal, peripheral pulses normal, , has no edema   NEUROLOGIC:  Awake, alert, oriented to name, place and time. Cranial nerves II-XII are grossly intact. Motor i left-sided weakness sensory is intact. ,  and gait is normal.    DATA:    CBC:   Recent Labs     01/23/23  0532   WBC 11.9*   HGB 15.3       CMP:  Recent Labs     01/23/23  0532 01/23/23  1034   * 133*   K 5.0 4.6   CL 90* 98*   CO2 18* 25   BUN 34* 30*   CREATININE 1.2 1.1   CALCIUM 9.3 8.9   PROT 6.7  --    LABALBU 4.1  --    BILITOT 0.1  --    ALKPHOS 114  --    AST 7*  --    ALT 7*  --      Lipids: No results found for: CHOL, HDL, TRIG  Glucose:   Recent Labs     01/23/23  1246   POCGLU 455*     Hemoglobin A1C: No results found for: LABA1C  Free T4: No results found for: T4FREE  Free T3: No results found for: FT3  TSH High Sensitivity: No results found for: TSHHS    CTA HEAD NECK W CONTRAST   Final Result   1. Severe narrowing at the right vertebral artery origin and mild narrowing   at the left vertebral artery origin. 2. Fibrocalcific plaque is noted in bilateral carotid bulbs and proximal   internal carotid arteries without evidence of a flow-limiting stenosis. 3. Unremarkable CTA of the brain. 4. Dental caries with periodontal disease. CT CERVICAL SPINE WO CONTRAST   Final Result   No acute intracranial abnormality. Degenerative change in the cervical spine         CT HEAD WO CONTRAST   Final Result   No acute intracranial abnormality. Degenerative change in the cervical spine         XR CHEST PORTABLE   Final Result   No acute cardiopulmonary process.                 Scheduled Medicines   Medications:    nicotine  1 patch TransDERmal Daily    sodium chloride flush  5-40 mL IntraVENous 2 times per day    enoxaparin  40 mg SubCUTAneous Daily    insulin lispro  15 Units SubCUTAneous TID WC    insulin glargine  40 Units SubCUTAneous Nightly    insulin lispro 0-8 Units SubCUTAneous TID     insulin NPH  10 Units SubCUTAneous Once    insulin lispro  0-8 Units SubCUTAneous 2 times per day      Infusions:    sodium chloride      sodium chloride           IMPRESSION    Patient Active Problem List   Diagnosis    DKA, type 1, not at goal (HCC)    Hyperglycemia         RECOMMENDATIONS:      Reviewed POC blood glucose . Labs and X ray results   Reviewed Home and Current Medicines   Will Start On meal/ Correction bolus Humalog/ Lantus Insulin regime   Monitor Blood glucose frequently   Modify  the dose of Insulin as needed        Will follow with you  Again thank you for sharing pt's care with me.     Truly yours,       NICKY Jamison MD

## 2023-01-23 NOTE — ED PROVIDER NOTES
Emergency Department Encounter    Patient: Felicia De La O  MRN: [id number]  : 1960  Date of Evaluation: 2023  ED Provider:  Harsh Bazzi DO    Triage Chief Complaint:   Spasms (Left side, started Saturday.) and Muscle Pain (Left arm)    HPI:  Felicia De La O is a 63 y.o. male with no significant past medical history who presents with spasm of his left upper extremity. He states this has been present for the last 5 days, however it became constant and more severe over the last 48 hours. He does not typically see a physician, and has not been unable to see 1 for this problem. He states that last night he felt as though he could not catch his breath secondary to the spasm, and felt like he was being woken up with the need to gasp for air which prompted him to come into the ED. Patient is not taking any medication for the spasm. He denies any trauma. Denies any neck or back pain. Denies any fever, chills, chest pain, shortness of breath, palpitations, nausea, vomiting, abdominal pain, dysuria, diarrhea. ROS:  Review of Systems   Constitutional:  Negative for chills and fever. HENT:  Negative for congestion, rhinorrhea, sinus pressure and sore throat. Eyes:  Negative for visual disturbance. Respiratory:  Negative for cough, shortness of breath and wheezing. Cardiovascular:  Negative for chest pain and palpitations. Gastrointestinal:  Negative for abdominal pain, constipation, diarrhea, nausea and vomiting. Genitourinary:  Negative for dysuria, frequency and urgency. Musculoskeletal:  Positive for arthralgias and myalgias. Skin:  Negative for rash. Neurological:  Negative for dizziness and syncope. Psychiatric/Behavioral:  Negative for agitation, behavioral problems and confusion. History reviewed. No pertinent past medical history. History reviewed. No pertinent surgical history. History reviewed. No pertinent family history.   Social History Socioeconomic History    Marital status:      Spouse name: Not on file    Number of children: Not on file    Years of education: Not on file    Highest education level: Not on file   Occupational History    Not on file   Tobacco Use    Smoking status: Every Day     Packs/day: 0.50     Types: Cigarettes    Smokeless tobacco: Never   Substance and Sexual Activity    Alcohol use: Not Currently    Drug use: Never    Sexual activity: Not on file   Other Topics Concern    Not on file   Social History Narrative    Not on file     Social Determinants of Health     Financial Resource Strain: Not on file   Food Insecurity: Not on file   Transportation Needs: Not on file   Physical Activity: Not on file   Stress: Not on file   Social Connections: Not on file   Intimate Partner Violence: Not on file   Housing Stability: Not on file     No current facility-administered medications for this encounter. Current Outpatient Medications   Medication Sig Dispense Refill    ibuprofen (ADVIL;MOTRIN) 600 MG tablet Take 1 tablet by mouth every 6 hours as needed for Pain or Fever 60 tablet 0     No Known Allergies    Nursing Notes Reviewed    Physical Exam:  Triage VS:    ED Triage Vitals [01/23/23 0533]   Enc Vitals Group      BP (!) 153/98      Heart Rate 96      Resp 19      Temp 98.2 °F (36.8 °C)      Temp src       SpO2 95 %      Weight       Height       Head Circumference       Peak Flow       Pain Score       Pain Loc       Pain Edu? Excl. in [address]? Physical Exam  Vitals and nursing note reviewed. Constitutional:       General: He is not in acute distress. Appearance: Normal appearance. He is not ill-appearing or toxic-appearing. HENT:      Head: Normocephalic and atraumatic. Nose: Nose normal.      Mouth/Throat:      Mouth: Mucous membranes are moist.   Eyes:      Conjunctiva/sclera: Conjunctivae normal.   Cardiovascular:      Rate and Rhythm: Normal rate and regular rhythm.       Heart sounds: Normal heart sounds. Pulmonary:      Effort: Pulmonary effort is normal. No respiratory distress. Breath sounds: Normal breath sounds. No wheezing, rhonchi or rales. Abdominal:      General: Abdomen is flat. Bowel sounds are normal. There is no distension. Palpations: Abdomen is soft. Tenderness: There is no abdominal tenderness. There is no guarding or rebound. Musculoskeletal:      Comments: Left upper extremity:  Radial pulse and sensation intact. Distal capillary refill intact. Patient with intermittent diffuse spasm in this extremity throughout exam.  Does have episodes where arm completely relaxes. Skin:     General: Skin is warm. Capillary Refill: Capillary refill takes less than 2 seconds. Neurological:      Mental Status: He is alert and oriented to person, place, and time. Mental status is at baseline.    Psychiatric:         Mood and Affect: Mood normal.            I have reviewed and interpreted all of the currently available lab results from this visit (if applicable):  Results for orders placed or performed during the hospital encounter of 01/23/23   CBC with Auto Differential   Result Value Ref Range    WBC 11.9 (H) 4.0 - 10.5 K/CU MM    RBC 4.59 (L) 4.6 - 6.2 M/CU MM    Hemoglobin 15.3 13.5 - 18.0 GM/DL    Hematocrit 43.9 42 - 52 %    MCV 95.6 78 - 100 FL    MCH 33.3 (H) 27 - 31 PG    MCHC 34.9 32.0 - 36.0 %    RDW 13.2 11.7 - 14.9 %    Platelets 403 388 - 132 K/CU MM    MPV 12.8 (H) 7.5 - 11.1 FL    Differential Type AUTOMATED DIFFERENTIAL     Segs Relative 73.9 (H) 36 - 66 %    Lymphocytes % 11.5 (L) 24 - 44 %    Monocytes % 11.5 (H) 0 - 4 %    Eosinophils % 1.0 0 - 3 %    Basophils % 0.9 0 - 1 %    Segs Absolute 8.8 K/CU MM    Lymphocytes Absolute 1.4 K/CU MM    Monocytes Absolute 1.4 K/CU MM    Eosinophils Absolute 0.1 K/CU MM    Basophils Absolute 0.1 K/CU MM    Nucleated RBC % 0.0 %    Total Nucleated RBC 0.0 K/CU MM    Total Immature Neutrophil 0.14 K/CU MM Immature Neutrophil % 1.2 (H) 0 - 0.43 %   CMP   Result Value Ref Range    Sodium 125 (L) 135 - 145 MMOL/L    Potassium 5.0 3.5 - 5.1 MMOL/L    Chloride 90 (L) 99 - 110 mMol/L    CO2 18 (L) 21 - 32 MMOL/L    BUN 34 (H) 6 - 23 MG/DL    Creatinine 1.2 0.9 - 1.3 MG/DL    Est, Glom Filt Rate >60 >60 mL/min/1.73m2    Glucose 878 (HH) 70 - 99 MG/DL    Calcium 9.3 8.3 - 10.6 MG/DL    Albumin 4.1 3.4 - 5.0 GM/DL    Total Protein 6.7 6.4 - 8.2 GM/DL    Total Bilirubin 0.1 0.0 - 1.0 MG/DL    ALT 7 (L) 10 - 40 U/L    AST 7 (L) 15 - 37 IU/L    Alkaline Phosphatase 114 40 - 129 IU/L    Anion Gap 17 (H) 4 - 16   Lipase   Result Value Ref Range    Lipase 66 (H) 13 - 60 IU/L   Magnesium   Result Value Ref Range    Magnesium 2.2 1.8 - 2.4 mg/dl   Troponin   Result Value Ref Range    Troponin T <0.010 <0.01 NG/ML   Calcium, Ionized   Result Value Ref Range    Ionized Ca 1.15 1.12 - 1.32 mMOL/L    Calcium, Ionized 4.60 4.48 - 5.28 MG/DL   CK   Result Value Ref Range    Total CK 61 38 - 174 IU/L   Blood Gas, Venous   Result Value Ref Range    pH, George 7.34 7.32 - 7.42    pCO2, George 40 38 - 52 mmHG    pO2, George 140 (H) 28 - 48 mmHG    Base Excess 4 (H) 0 - 3.3    HCO3, Venous 21.6 19 - 25 MMOL/L    O2 Sat, George 91.0 (H) 50 - 70 %    Comment VBG       Radiographs (if obtained):    [] Radiologist's Report Reviewed:  XR CHEST PORTABLE    (Results Pending)   CT HEAD WO CONTRAST    (Results Pending)   CT CERVICAL SPINE WO CONTRAST    (Results Pending)       Chart review shows recent radiographs:  No results found. MDM:  CC/HPI Summary, DDx, ED Course, and Reassessment:   Patient is a 82-year-old male who presents with spasm of his left upper extremity. Differential diagnosis includes focal seizure, electrolyte abnormality, ACS. Patient with mild leukocytosis of 11.9, otherwise hemoglobin and platelets stable. Sodium 125, however glucose is 878, corrected sodium is 137. Patient does have elevated anion gap of 17 with bicarb of 18.   VBG with pH of 7.34.  Ionized calcium and magnesium within acceptable limits.  BUN, creatinine, ALT, AST, lipase within acceptable limits.  Troponin not detected.  At time of completion of this note, imaging is pending.  Care of patient transferred from me to oncoming physician, Dr. Saleem who agrees to follow-up on imaging and disposition patient appropriately.      Patient was given the following medications:  Medications   LORazepam (ATIVAN) injection 0.5 mg (0.5 mg IntraVENous Given 1/23/23 0543)     EKG (if obtained): (All EKG's are interpreted by myself in the absence of a cardiologist)   Normal sinus rhythm, rate 96, , , QTc 437, no acute ST elevation.        I am the Primary Clinician of Record.       Clinical Impression:  1. Spasm of muscle    2. Hyperglycemia      Disposition referral (if applicable):  No follow-up provider specified.  Disposition medications (if applicable):  New Prescriptions    No medications on file       Comment: Please note this report has been produced using speech recognition software and may contain errors related to that system including errors in grammar, punctuation, and spelling, as well as words and phrases that may be inappropriate.  Efforts were made to edit the dictations.       Allie Hernández,   01/23/23 0701

## 2023-01-23 NOTE — ED NOTES
Medication History  [facility]    Patient Name: Mireya Burgos 1960     Medication history has been completed by: Johnny Jorgensen CPhT    Source(s) of information: patient and wife     Primary Care Physician: AMADO Xiao - CNP     [facility]arm[facility]: [facility]    Allergies as of 01/23/2023    (No Known Allergies)        Prior to Admission medications    Medication Sig Start Date End Date Taking? Authorizing Provider   naproxen sodium (ALEVE) 220 MG tablet Take 220 mg by mouth every morning   Yes Historical Provider, MD     Medications added or changed (ex. new medication, dosage change, interval change, formulation change):  IBU changed to aleve     Comments:  Patient states he takes no prescription medication.   States he takes an Aleve every morning    To my knowledge the above medication history is accurate as of 1/23/2023 10:07 AM.   Johnny Jorgensen CPhT   1/23/2023 10:07 AM

## 2023-01-23 NOTE — ED PROVIDER NOTES
eMERGENCY dEPARTMENT eNCOUnter    ATTENDING SIGN OUT NOTE    HPI/Physical Exam/Medical Decision Making  Time: 06:30 am  I have received sign out of [facility]  [facility] care from Dr. Lima Barber. We discussed the history, physical exam, completed/pending test results (if obtained) and current treatment plan. Please refer to his/her chart for further details. In brief, the patient is a 63 y.o. male who presented to the ED with arm spasms. He states it started 5 days ago. It has been intermittent but has become more frequent. He also tells me his left arm feels weak. He is pending labs and imaging which I am asked to follow-up and disposition the patient. Physical Exam  Vitals: BP (!) 147/86   Pulse 59   Temp 97.5 °F (36.4 °C) (Oral)   Resp 17   Ht 6' (1.829 m)   Wt 188 lb 6.4 oz (85.5 kg)   SpO2 99%   BMI 25.55 kg/m²   On my exam, the patient is afebrile and nontoxic appearing. He is mildly hypertensive on arrival but this resolves. He is otherwise hemodynamically stable. He has a weak left hand grasp. He is otherwise neurologically intact. His left arm frequently flexes at the elbow and his fist draws up to his shoulder region. Airway is patent. Neck is supple. Heart sounds have a regular rate and rhythm. Lungs are clear to auscultation bilaterally. The patient's abdomen is soft, non-tender and non-distended with no peritoneal signs. Extremities are warm, pink, and well perfused. Diagnostics:     CTA HEAD NECK W CONTRAST (Final result)  Result time 01/23/23 11:30:13  Final result by Madison Zamorano MD (01/23/23 11:30:13)                Impression:    1. Severe narrowing at the right vertebral artery origin and mild narrowing   at the left vertebral artery origin. 2. Fibrocalcific plaque is noted in bilateral carotid bulbs and proximal   internal carotid arteries without evidence of a flow-limiting stenosis. 3. Unremarkable CTA of the brain.    4. Dental caries with periodontal disease. Narrative:    EXAMINATION:   CTA OF THE HEAD AND NECK WITH CONTRAST 1/23/2023 10:46 am:     TECHNIQUE:   CTA of the head and neck was performed with the administration of intravenous   contrast. Multiplanar reformatted images are provided for review. MIP images   are provided for review. Stenosis of the internal carotid arteries measured   using NASCET criteria. Automated exposure control, iterative reconstruction,   and/or weight based adjustment of the mA/kV was utilized to reduce the   radiation dose to as low as reasonably achievable. COMPARISON:   Head CT on 01/23/2023. HISTORY:   ORDERING SYSTEM PROVIDED HISTORY: left arm weakness   TECHNOLOGIST PROVIDED HISTORY:   Reason for exam:->left arm weakness   Has a \"code stroke\" or \"stroke alert\" been called? ->No   Decision Support Exception - unselect if not a suspected or confirmed   emergency medical condition->Emergency Medical Condition (MA)   Reason for Exam: left arm weakness   Relevant Medical/Surgical History: [id number]     FINDINGS:     CTA NECK:     AORTIC ARCH/ARCH VESSELS: Vascular calcifications are noted along the aortic   arch. There is a normal branch pattern of the aortic arch. The innominate   and subclavian arteries are patent without evidence of a flow-limiting   stenosis. CAROTID ARTERIES: The right common carotid artery is normal in caliber. Fibrocalcific plaque is noted in the right carotid bulb and proximal right   internal carotid artery. No flow-limiting stenosis or dissection. There is mild fibrofatty plaque in the left common carotid artery with areas   of minimal narrowing. Fibrocalcific plaque is noted in the proximal left   internal carotid artery without evidence of a flow-limiting stenosis. VERTEBRAL ARTERIES: There is severe narrowing at the right vertebral artery   origin. Mild narrowing at the left vertebral artery origin. No dissection.      SOFT TISSUES: There are calcified granulomas in the lungs. No superior   mediastinal adenopathy. The thyroid gland is normal in appearance. Submandibular and parotid glands are unremarkable. The parapharyngeal fat   spaces are preserved. No cervical or supraclavicular adenopathy. BONES: There are dental caries with periodontal disease. Degenerative   changes are noted in the spine. No fracture. CTA HEAD:     ANTERIOR CIRCULATION: Vascular calcifications are noted in the supraclinoid   ICAs. The left A1 segment is hypoplastic. There is an azygous A2 segment. The middle cerebral arteries are unremarkable. POSTERIOR CIRCULATION: The P-comms are patent. The basilar artery is patent. There is a complete fetal origin of the left posterior cerebral artery. There is a near complete fetal origin of the right posterior cerebral artery. OTHER: No dural venous sinus thrombosis on this non-dedicated study. BRAIN: No areas of abnormal enhancement. No midline shift. CT CERVICAL SPINE WO CONTRAST (Final result)  Result time 01/23/23 07:18:25  Final result by Bianka Oropeza MD (01/23/23 07:18:25)                Impression:    No acute intracranial abnormality. Degenerative change in the cervical spine             Narrative:    EXAMINATION:   CT OF THE HEAD WITHOUT CONTRAST; CT OF THE CERVICAL SPINE WITHOUT CONTRAST   1/23/2023 6:04 am     TECHNIQUE:   CT of the head was performed without the administration of intravenous   contrast. Automated exposure control, iterative reconstruction, and/or weight   based adjustment of the mA/kV was utilized to reduce the radiation dose to as   low as reasonably achievable.; CT of the cervical spine was performed without   the administration of intravenous contrast. Multiplanar reformatted images   are provided for review.  Automated exposure control, iterative   reconstruction, and/or weight based adjustment of the mA/kV was utilized to   reduce the radiation dose to as low as reasonably achievable. COMPARISON:   None. HISTORY:   ORDERING SYSTEM PROVIDED HISTORY: left arm spasm   TECHNOLOGIST PROVIDED HISTORY:   Reason for exam:->left arm spasm   Has a \"code stroke\" or \"stroke alert\" been called? ->No   Decision Support Exception - unselect if not a suspected or confirmed   emergency medical condition->Emergency Medical Condition (MA)   Reason for Exam: left arm spasm     FINDINGS:   BRAIN/VENTRICLES: There is no acute intracranial hemorrhage, mass effect or   midline shift. No abnormal extra-axial fluid collection. The gray-white   differentiation is maintained without evidence of an acute infarct. There is   no evidence of hydrocephalus. ORBITS: The visualized portion of the orbits demonstrate no acute abnormality. SINUSES: The visualized paranasal sinuses and mastoid air cells demonstrate   no acute abnormality. SOFT TISSUES/SKULL:  No acute abnormality of the visualized skull or soft   tissues. CT cervical spine: There is degenerative change in the cervical spine at   multiple levels with decreased disc space height, osteophytosis, and   posterior spondylitic ridging. This is most pronounced at C4-5, C5-6, and   C6-7. No acute fracture or dislocation is seen. CT HEAD WO CONTRAST (Final result)  Result time 01/23/23 07:18:25  Final result by Harini Benavides MD (01/23/23 07:18:25)                Impression:    No acute intracranial abnormality.      Degenerative change in the cervical spine             Narrative:    EXAMINATION:   CT OF THE HEAD WITHOUT CONTRAST; CT OF THE CERVICAL SPINE WITHOUT CONTRAST   1/23/2023 6:04 am     TECHNIQUE:   CT of the head was performed without the administration of intravenous   contrast. Automated exposure control, iterative reconstruction, and/or weight   based adjustment of the mA/kV was utilized to reduce the radiation dose to as   low as reasonably achievable.; CT of the cervical spine was performed without   the administration of intravenous contrast. Multiplanar reformatted images   are provided for review. Automated exposure control, iterative   reconstruction, and/or weight based adjustment of the mA/kV was utilized to   reduce the radiation dose to as low as reasonably achievable. COMPARISON:   None. HISTORY:   ORDERING SYSTEM PROVIDED HISTORY: left arm spasm   TECHNOLOGIST PROVIDED HISTORY:   Reason for exam:->left arm spasm   Has a \"code stroke\" or \"stroke alert\" been called? ->No   Decision Support Exception - unselect if not a suspected or confirmed   emergency medical condition->Emergency Medical Condition (MA)   Reason for Exam: left arm spasm     FINDINGS:   BRAIN/VENTRICLES: There is no acute intracranial hemorrhage, mass effect or   midline shift. No abnormal extra-axial fluid collection. The gray-white   differentiation is maintained without evidence of an acute infarct. There is   no evidence of hydrocephalus. ORBITS: The visualized portion of the orbits demonstrate no acute abnormality. SINUSES: The visualized paranasal sinuses and mastoid air cells demonstrate   no acute abnormality. SOFT TISSUES/SKULL:  No acute abnormality of the visualized skull or soft   tissues. CT cervical spine: There is degenerative change in the cervical spine at   multiple levels with decreased disc space height, osteophytosis, and   posterior spondylitic ridging. This is most pronounced at C4-5, C5-6, and   C6-7. No acute fracture or dislocation is seen. XR CHEST PORTABLE (Final result)  Result time 01/23/23 07:03:56  Final result by Evangelina Garces MD (01/23/23 07:03:56)                Impression:    No acute cardiopulmonary process. Narrative:    EXAMINATION:   ONE XRAY VIEW OF THE CHEST     1/23/2023 5:56 am     COMPARISON:   None.      HISTORY:   ORDERING SYSTEM PROVIDED HISTORY: left arm pain   TECHNOLOGIST PROVIDED HISTORY:   Reason for exam:->left arm pain   Reason for Exam: Spasms; Muscle Pain   Additional signs and symptoms: left arm pain, spasms     FINDINGS:   There is no consolidation, pleural effusion, or pneumothorax. Calcified   granulomas at the left apex and in both lower lungs. Cardiac silhouette is   not enlarged and there is no pulmonary vascular congestion. Mediastinum and   jersey are within normal limits. Bony thorax is unremarkable. Labs Reviewed   CBC WITH AUTO DIFFERENTIAL - Abnormal; Notable for the following components:       Result Value    WBC 11.9 (*)     RBC 4.59 (*)     MCH 33.3 (*)     MPV 12.8 (*)     Segs Relative 73.9 (*)     Lymphocytes % 11.5 (*)     Monocytes % 11.5 (*)     Immature Neutrophil % 1.2 (*)     All other components within normal limits   COMPREHENSIVE METABOLIC PANEL - Abnormal; Notable for the following components:    Sodium 125 (*)     Chloride 90 (*)     CO2 18 (*)     BUN 34 (*)     Glucose 878 (*)     ALT 7 (*)     AST 7 (*)     Anion Gap 17 (*)     All other components within normal limits   LIPASE - Abnormal; Notable for the following components:    Lipase 66 (*)     All other components within normal limits   BETA-HYDROXYBUTYRATE - Abnormal; Notable for the following components:    Beta-Hydroxybutyrate >20.8 (*)     All other components within normal limits   BLOOD GAS, VENOUS - Abnormal; Notable for the following components:    pO2, George 140 (*)     Base Excess 4 (*)     O2 Sat, George 91.0 (*)     All other components within normal limits   BASIC METABOLIC PANEL - Abnormal; Notable for the following components:    Sodium 133 (*)     Chloride 98 (*)     BUN 30 (*)     Glucose 590 (*)     All other components within normal limits       ED COURSE & MEDICAL DECISION MAKING:  Nickolas Lilly is a 63 y.o. male who presents to the Emergency Department complaining of left arm weakness and spasms. Please see HPI for details.     History from: Patient    Limitations to history: None    Chronic conditions affecting care: None    Social Determinants[de-identified] None    Records Reviewed: None    On exam, the patient is afebrile and nontoxic appearing. He is mildly hypertensive on arrival but this resolves. He is otherwise hemodynamically stable. He has a weak left hand grasp. He is otherwise neurologically intact. Labs are obtained and are significant for upper glycemia with a glucose of 878, and elevated anion gap of 17, decreased bicarb of 18, pseudohyponatremia, a lipase that is elevated at 66, mild leukocytosis with a left shift, beta hydroxybutyrate greater than 20, and a venous blood gas that shows a normal venous pH of 7.34 and a normal PCO2 of 40. CT head without contrast is negative for acute intracranial abnormality. CT cervical spine shows degenerative change in the cervical spine. CTA of the head and neck shows severe narrowing at the right vertebral artery origin and mild narrowing at the left vertebral artery origin. Fibrocalcific plaque is noted in bilateral carotid bulbs and proximal internal carotid arteries without evidence of a flow-limiting stenosis. CT of the brain is unremarkable. There are dental caries with periodontal disease. The patient was treated with 0.5 mg of Ativan, 1 L of IV normal saline, nicotine patch, 10 units of subcutaneous insulin and 5 units of IV insulin. Diagnosis and Differential Diagnosis:  I suspect that the patient has new onset diabetes with diabetic ketosis without acidosis. I have a low suspicion for DKA, nonketotic, hyperosmolar hyperglycemia, acute CVA, intracranial hemorrhage, brain mass or electrolyte derangement. Disposition Considerations (tests considered but not done, Shared Decision Making, Pt Expectation of Test or Tx.):   I recommended admission to the hospital and the patient was agreeable. I discussed the case with the hospitalist who spoke with the ICU physician who was planning to admit the patient. However, he asked to have a repeat BMP after the fluids and insulin were given which show marked improvement with resolution of the elevated anion gap from 17 down to 10 and increased bicarb of 25. Glucose improved to 590. The patient does not require the ICU at this time and will be admitted to the hospitalist who is updated and agreeable to admit. I am the Primary Clinician of Record. Clinical Impression:  1. Diabetes mellitus, new onset ([address]    2. Spasm of muscle    3. Left arm weakness    4. Hypoglycemia    5. Diabetic ketosis ([address]        Comment: Please note this report has been produced using speech recognition software and may contain errors related to that system including errors in grammar, punctuation, and spelling, as well as words and phrases that may be inappropriate. If there are any questions or concerns please feel free to contact the dictating provider for clarification.                  Jose Ivy MD  01/24/23 7932

## 2023-01-23 NOTE — CARE COORDINATION
MCG criteria for hyperglycemia  reviewed at this time, criteria supports Inpatient Admission.  SUSY,RN/CM

## 2023-01-23 NOTE — ED NOTES
ED TO INPATIENT SBAR HANDOFF    Patient Name: Veronica Galindo   :  1960  63 y.o. MRN:  [id number]  Preferred Name  Olga Apodaca  ED Room #:  ED28/ED-28  Family/Caregiver Present yes   Restraints no   Sitter no   Sepsis Risk Score Sepsis Risk Score: 0.87    Situation  Code Status: No Order No additional code details. Allergies: Patient has no known allergies. Weight: No data found. Arrived from: home  Chief Complaint:   Chief Complaint   Patient presents with    Spasms     Left side, started Saturday. Muscle Pain     Left arm     Hospital Problem/Diagnosis:  Active Problems:    * No active hospital problems. *  Resolved Problems:    * No resolved hospital problems. *    Imaging:   CT CERVICAL SPINE WO CONTRAST   Final Result   No acute intracranial abnormality. Degenerative change in the cervical spine         CT HEAD WO CONTRAST   Final Result   No acute intracranial abnormality. Degenerative change in the cervical spine         XR CHEST PORTABLE   Final Result   No acute cardiopulmonary process.            Abnormal labs:   Abnormal Labs Reviewed   CBC WITH AUTO DIFFERENTIAL - Abnormal; Notable for the following components:       Result Value    WBC 11.9 (*)     RBC 4.59 (*)     MCH 33.3 (*)     MPV 12.8 (*)     Segs Relative 73.9 (*)     Lymphocytes % 11.5 (*)     Monocytes % 11.5 (*)     Immature Neutrophil % 1.2 (*)     All other components within normal limits   COMPREHENSIVE METABOLIC PANEL - Abnormal; Notable for the following components:    Sodium 125 (*)     Chloride 90 (*)     CO2 18 (*)     BUN 34 (*)     Glucose 878 (*)     ALT 7 (*)     AST 7 (*)     Anion Gap 17 (*)     All other components within normal limits   LIPASE - Abnormal; Notable for the following components:    Lipase 66 (*)     All other components within normal limits   BETA-HYDROXYBUTYRATE - Abnormal; Notable for the following components:    Beta-Hydroxybutyrate >20.8 (*)     All other components within normal limits BLOOD GAS, VENOUS - Abnormal; Notable for the following components:    pO2, George 140 (*)     Base Excess 4 (*)     O2 Sat, George 91.0 (*)     All other components within normal limits     Critical values: yes     Abnormal Assessment Findings:     Background  History: History reviewed. No pertinent past medical history. Assessment    Vitals/MEWS: MEWS Score: 0  Level of Consciousness: Alert (0)   Vitals:    01/23/23 0533 01/23/23 0603 01/23/23 0733 01/23/23 0901   BP: (!) 153/98 103/72 123/77 (!) 136/108   Pulse: 96 92 83 80   Resp: 19 18 27 13   Temp: 98.2 °F (36.8 °C)   98.2 °F (36.8 °C)   SpO2: 95% 95% 95% 97%     FiO2 (%): n/a  O2 Flow Rate:      Cardiac Rhythm:   Pain Assessment:  [x] Verbal [] Yung Countess Scale  Pain Scale: Pain Assessment  Pain Assessment: 0-10  Pain Level: 6  Last documented pain score (0-10 scale) Pain Level: 6  Last documented pain medication administered: none  Mental Status: oriented, alert, coherent, logical, thought processes intact, and able to concentrate and follow conversation  NIH Score: NIH     C-SSRS: Risk of Suicide: No Risk  Bedside swallow:    [place] Coma Scale (GCS): Sana Coma Scale  Eye Opening: Spontaneous  Best Verbal Response: Oriented  Best Motor Response: Obeys commands  Sana Coma Scale Score: 15  Active LDA's:   Peripheral IV 01/23/23 Right Forearm (Active)     PO Status: Regular  Pertinent or High Risk Medications/Drips: no   If Yes, please provide details: n/a  Pending Blood Product Administration: no     You may also review the ED PT Care Timeline found under the Summary Nursing Index tab. Recommendation    Pending orders admit orders  Plan for Discharge (if known): Additional Comments: pt from home. Takes meds whole.     If any further questions, please call Sending RN at [phone]    Electronically signed by: Electronically signed by Leana Zimmer RN on 1/23/2023 at 9:08 AM      Leana Zimmer, [facility]  01/23/23 3231

## 2023-01-24 ENCOUNTER — APPOINTMENT (OUTPATIENT)
Dept: MRI IMAGING | Age: 63
DRG: 639 | End: 2023-01-24

## 2023-01-24 LAB
ALBUMIN SERPL-MCNC: 3.2 GM/DL (ref 3.4–5)
ALP BLD-CCNC: 63 IU/L (ref 40–128)
ALT SERPL-CCNC: 9 U/L (ref 10–40)
ANION GAP SERPL CALCULATED.3IONS-SCNC: 9 MMOL/L (ref 4–16)
AST SERPL-CCNC: 10 IU/L (ref 15–37)
BASOPHILS ABSOLUTE: 0.1 K/CU MM
BASOPHILS RELATIVE PERCENT: 0.9 % (ref 0–1)
BILIRUB SERPL-MCNC: 0.2 MG/DL (ref 0–1)
BUN BLDV-MCNC: 24 MG/DL (ref 6–23)
CALCIUM SERPL-MCNC: 8.7 MG/DL (ref 8.3–10.6)
CHLORIDE BLD-SCNC: 108 MMOL/L (ref 99–110)
CHOLESTEROL: 141 MG/DL
CO2: 23 MMOL/L (ref 21–32)
CREAT SERPL-MCNC: 1 MG/DL (ref 0.9–1.3)
DIFFERENTIAL TYPE: ABNORMAL
EOSINOPHILS ABSOLUTE: 0.1 K/CU MM
EOSINOPHILS RELATIVE PERCENT: 1.2 % (ref 0–3)
GFR SERPL CREATININE-BSD FRML MDRD: >60 ML/MIN/1.73M2
GLUCOSE BLD-MCNC: 157 MG/DL (ref 70–99)
GLUCOSE BLD-MCNC: 164 MG/DL (ref 70–99)
GLUCOSE BLD-MCNC: 216 MG/DL (ref 70–99)
GLUCOSE BLD-MCNC: 239 MG/DL (ref 70–99)
GLUCOSE BLD-MCNC: 273 MG/DL (ref 70–99)
GLUCOSE BLD-MCNC: 87 MG/DL (ref 70–99)
HCT VFR BLD CALC: 36.5 % (ref 42–52)
HDLC SERPL-MCNC: 25 MG/DL
HEMOGLOBIN: 12.5 GM/DL (ref 13.5–18)
IMMATURE NEUTROPHIL %: 0.6 % (ref 0–0.43)
LDL CHOLESTEROL CALCULATED: 62 MG/DL
LYMPHOCYTES ABSOLUTE: 3.2 K/CU MM
LYMPHOCYTES RELATIVE PERCENT: 27 % (ref 24–44)
MCH RBC QN AUTO: 32.9 PG (ref 27–31)
MCHC RBC AUTO-ENTMCNC: 34.2 % (ref 32–36)
MCV RBC AUTO: 96.1 FL (ref 78–100)
MONOCYTES ABSOLUTE: 1.2 K/CU MM
MONOCYTES RELATIVE PERCENT: 10.2 % (ref 0–4)
NUCLEATED RBC %: 0 %
PDW BLD-RTO: 13.5 % (ref 11.7–14.9)
PLATELET # BLD: 206 K/CU MM (ref 140–440)
PMV BLD AUTO: 12.2 FL (ref 7.5–11.1)
POTASSIUM SERPL-SCNC: 4.4 MMOL/L (ref 3.5–5.1)
RBC # BLD: 3.8 M/CU MM (ref 4.6–6.2)
SEGMENTED NEUTROPHILS ABSOLUTE COUNT: 7.1 K/CU MM
SEGMENTED NEUTROPHILS RELATIVE PERCENT: 60.1 % (ref 36–66)
SODIUM BLD-SCNC: 140 MMOL/L (ref 135–145)
TOTAL IMMATURE NEUTOROPHIL: 0.07 K/CU MM
TOTAL NUCLEATED RBC: 0 K/CU MM
TOTAL PROTEIN: 5.2 GM/DL (ref 6.4–8.2)
TOTAL RETICULOCYTE COUNT: 0.07 K/CU MM
TRIGL SERPL-MCNC: 268 MG/DL
WBC # BLD: 11.7 K/CU MM (ref 4–10.5)

## 2023-01-24 PROCEDURE — 94761 N-INVAS EAR/PLS OXIMETRY MLT: CPT

## 2023-01-24 PROCEDURE — 99255 IP/OBS CONSLTJ NEW/EST HI 80: CPT | Performed by: PSYCHIATRY & NEUROLOGY

## 2023-01-24 PROCEDURE — 6360000002 HC RX W HCPCS: Performed by: STUDENT IN AN ORGANIZED HEALTH CARE EDUCATION/TRAINING PROGRAM

## 2023-01-24 PROCEDURE — 6370000000 HC RX 637 (ALT 250 FOR IP): Performed by: EMERGENCY MEDICINE

## 2023-01-24 PROCEDURE — 80061 LIPID PANEL: CPT

## 2023-01-24 PROCEDURE — 6370000000 HC RX 637 (ALT 250 FOR IP): Performed by: INTERNAL MEDICINE

## 2023-01-24 PROCEDURE — 6370000000 HC RX 637 (ALT 250 FOR IP): Performed by: STUDENT IN AN ORGANIZED HEALTH CARE EDUCATION/TRAINING PROGRAM

## 2023-01-24 PROCEDURE — 70551 MRI BRAIN STEM W/O DYE: CPT

## 2023-01-24 PROCEDURE — 1200000000 HC SEMI PRIVATE

## 2023-01-24 PROCEDURE — 85025 COMPLETE CBC W/AUTO DIFF WBC: CPT

## 2023-01-24 PROCEDURE — 2580000003 HC RX 258: Performed by: STUDENT IN AN ORGANIZED HEALTH CARE EDUCATION/TRAINING PROGRAM

## 2023-01-24 PROCEDURE — 80053 COMPREHEN METABOLIC PANEL: CPT

## 2023-01-24 RX ORDER — INSULIN LISPRO 100 [IU]/ML
10 INJECTION, SOLUTION INTRAVENOUS; SUBCUTANEOUS
Status: DISCONTINUED | OUTPATIENT
Start: 2023-01-24 | End: 2023-01-26 | Stop reason: HOSPADM

## 2023-01-24 RX ORDER — ROSUVASTATIN CALCIUM 20 MG/1
40 TABLET, COATED ORAL NIGHTLY
Status: DISCONTINUED | OUTPATIENT
Start: 2023-01-24 | End: 2023-01-26 | Stop reason: HOSPADM

## 2023-01-24 RX ORDER — INSULIN GLARGINE 100 [IU]/ML
30 INJECTION, SOLUTION SUBCUTANEOUS NIGHTLY
Status: DISCONTINUED | OUTPATIENT
Start: 2023-01-24 | End: 2023-01-26 | Stop reason: HOSPADM

## 2023-01-24 RX ADMIN — TIZANIDINE 4 MG: 4 TABLET ORAL at 21:07

## 2023-01-24 RX ADMIN — INSULIN LISPRO 4 UNITS: 100 INJECTION, SOLUTION INTRAVENOUS; SUBCUTANEOUS at 20:59

## 2023-01-24 RX ADMIN — INSULIN LISPRO 10 UNITS: 100 INJECTION, SOLUTION INTRAVENOUS; SUBCUTANEOUS at 08:26

## 2023-01-24 RX ADMIN — TIZANIDINE 4 MG: 4 TABLET ORAL at 02:09

## 2023-01-24 RX ADMIN — ROSUVASTATIN CALCIUM 40 MG: 20 TABLET, COATED ORAL at 20:59

## 2023-01-24 RX ADMIN — SODIUM CHLORIDE: 9 INJECTION, SOLUTION INTRAVENOUS at 14:50

## 2023-01-24 RX ADMIN — INSULIN GLARGINE 30 UNITS: 100 INJECTION, SOLUTION SUBCUTANEOUS at 20:59

## 2023-01-24 RX ADMIN — Medication 5 ML: at 22:05

## 2023-01-24 RX ADMIN — INSULIN LISPRO 2 UNITS: 100 INJECTION, SOLUTION INTRAVENOUS; SUBCUTANEOUS at 08:26

## 2023-01-24 RX ADMIN — INSULIN LISPRO 2 UNITS: 100 INJECTION, SOLUTION INTRAVENOUS; SUBCUTANEOUS at 12:42

## 2023-01-24 RX ADMIN — ENOXAPARIN SODIUM 40 MG: 100 INJECTION SUBCUTANEOUS at 08:26

## 2023-01-24 RX ADMIN — SODIUM CHLORIDE: 9 INJECTION, SOLUTION INTRAVENOUS at 06:28

## 2023-01-24 RX ADMIN — INSULIN LISPRO 10 UNITS: 100 INJECTION, SOLUTION INTRAVENOUS; SUBCUTANEOUS at 12:43

## 2023-01-24 ASSESSMENT — ENCOUNTER SYMPTOMS
CONSTIPATION: 0
ABDOMINAL PAIN: 0
WHEEZING: 0
EYE PAIN: 0
EYE REDNESS: 0
VOMITING: 0
COUGH: 0
SINUS PAIN: 0
NAUSEA: 0
DIARRHEA: 0
BACK PAIN: 0
SHORTNESS OF BREATH: 0
EYE ITCHING: 0
SORE THROAT: 0
CHEST TIGHTNESS: 0
SINUS PRESSURE: 0

## 2023-01-24 ASSESSMENT — PAIN SCALES - GENERAL
PAINLEVEL_OUTOF10: 0
PAINLEVEL_OUTOF10: 6

## 2023-01-24 ASSESSMENT — PAIN DESCRIPTION - LOCATION: LOCATION: WRIST

## 2023-01-24 NOTE — CONSULTS
Neurology Service Consult Note  [address]   Patient Name: Lalo Patricio  : 1960        Subjective:   Reason for consult: Left arm spasm, jerking, weakness and numbness for 1 day  63 y.o. right-handed male with past medical history unknown as he does not follow with PCP presenting to [place] [id number] with left upper extremity spasm over the course of the last week that worsened over the last several days. Worsening is described as more constant. He reported that he felt he couldn't catch is breath because of the spasm. Patient also reports decreased  strength and numbness in the left upper extremity. Initial labs noted hyponatremia, BUN 34, creatning 1.2, bicarb 18, gap, 17, glucose 878. CTA head and neck noted severe narrowing at the right vertebral artery origin and mild narrowing at the left vertebral artery origin. Hyperglycemia is being managed by endocrinology. Neurology has been asked to evaluate the left upper extremity spasm/weakness. Chart was reviewed in detail. Patient was seen and assessed. He describes that he has been having ongoing spasms to his left hand that he describes as a clenching and twisting of his hand and fingers. He states he has to physically straighten his fingers out at times. This has been going on for years. Symptoms have been worsening though recently and now extending up into the left arm and is associated with jerking of the left arm. Patient states that his symptoms have decreased in frequency since he has been at the hospital.  He is only had 1 or 2 episodes today. There were no events noted while exam was going on. Patient denies any headache or vision changes. He does confirm that he has some clumsiness or difficulty manipulating small items with his left hand. He also states that he feels like his left hand is swollen. Patient lives at home and he is independent in his ADLs.   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 He is not been seen by a physician in many years for any type of primary care. History reviewed. No pertinent past medical history. :   History reviewed. No pertinent surgical history. Medications:  Scheduled Meds:   insulin glargine  30 Units SubCUTAneous Nightly    insulin lispro  10 Units SubCUTAneous TID WC    nicotine  1 patch TransDERmal Daily    sodium chloride flush  5-40 mL IntraVENous 2 times per day    enoxaparin  40 mg SubCUTAneous Daily    insulin lispro  0-8 Units SubCUTAneous TID WC    insulin lispro  0-8 Units SubCUTAneous 2 times per day     Continuous Infusions:   sodium chloride      sodium chloride 125 mL/hr at 01/24/23 0628     PRN Meds:.sodium chloride flush, sodium chloride, acetaminophen **OR** acetaminophen, potassium chloride **OR** potassium alternative oral replacement **OR** potassium chloride, magnesium sulfate, sennosides, tiZANidine    No Known Allergies  Social History     Socioeconomic History    Marital status:      Spouse name: Not on file    Number of children: Not on file    Years of education: Not on file    Highest education level: Not on file   Occupational History    Not on file   Tobacco Use    Smoking status: Every Day     Packs/day: 0.50     Types: Cigarettes    Smokeless tobacco: Never   Substance and Sexual Activity    Alcohol use: Not Currently    Drug use: Never    Sexual activity: Not on file   Other Topics Concern    Not on file   Social History Narrative    Not on file     Social Determinants of Health     Financial Resource Strain: Not on file   Food Insecurity: Not on file   Transportation Needs: Not on file   Physical Activity: Not on file   Stress: Not on file   Social Connections: Not on file   Intimate Partner Violence: Not on file   Housing Stability: Not on file      History reviewed. No pertinent family history.       ROS (10 systems)  In addition to that documented in the HPI above, the additional ROS was obtained:  Constitutional: Denies fevers or chills  Eyes: Denies vision changes  ENMT: Denies sore throat  CV: Denies chest pain  Resp: Denies SOB  GI: Denies vomiting or diarrhea  : Denies painful urination  MSK: Denies recent trauma  Skin: Denies new rashes  Neuro: Cramping, spasms and loss of dexterity in the left upper extremity  Endocrine: Denies unexpected weight loss  Heme: Denies bleeding disorders    Physical Exam:          Wt Readings from Last 3 Encounters:   01/23/23 188 lb 6.4 oz (85.5 kg)   04/15/19 200 lb (90.7 kg)     Temp Readings from Last 3 Encounters:   01/24/23 97.5 °F (36.4 °C) (Oral)   04/15/19 97.9 °F (36.6 °C) (Oral)     BP Readings from Last 3 Encounters:   01/24/23 (!) 148/83   04/15/19 (!) 140/81     Pulse Readings from Last 3 Encounters:   01/24/23 58   04/15/19 82        Gen: A&O x 4, NAD, cooperative  HEENT: NC/AT, EOMI, PERRL, mmm,  neck supple, no meningeal signs; Fundoscopic: no disc edema appreciated  Heart: NSR/SB  Lungs: Respirations even unlabored  Ext: no edema, no calf tenderness b/l  Psych: normal mood and affect  Skin: no rashes or lesions    NEUROLOGIC EXAM:    Mental Status: A&O to self, location, month and year, NAD, speech clear, language fluent, repetition and naming intact, follows commands appropriately    Cranial Nerve Exam:   CN II-XII:  PERRL, VFF, no nystagmus, no gaze paresis, sensation V1-V3 intact b/l, muscles of facial expression symmetric; hearing intact to conversational tone, palate elevates symmetrically, shoulder elevation symmetric and tongue protrudes midline with movement side to side.     Motor Exam:       Strength 5/5 UE right, 4/5 UE left with decreased  strength in the left hand 5/5 LE's b/l  Tone and bulk normal   No pronator drift    Deep Tendon Reflexes: 1 2/4 biceps, triceps, brachioradialis, patellar, and achilles b/l; flexor plantar responses b/l    Sensation: decreased to temperature and vibration in the left upper extremity otherwise sensation is intact. Coordination/Cerebellum:       Tremors--none      Rapidly alternating movements:  dysdiadochokinesia left               Heel-to-Shin: no dysmetria b/l      Finger-to-Nose: no dysmetria b/l    Gait and stance:      Gait: deferred      LABS:     Recent Labs     01/23/23  0532 01/23/23  1034 01/23/23  1616 01/24/23  0312   WBC 11.9*  --   --  11.7*   * 133* 139 140   K 5.0 4.6 4.3 4.4   CL 90* 98* 102 108   CO2 18* 25 27 23   BUN 34* 30* 26* 24*   CREATININE 1.2 1.1 1.2 1.0   GLUCOSE 878* 590* 369* 164*         IMAGING:    CT head and cervical spine w/o contrast:  Impression   No acute intracranial abnormality. Degenerative change in the cervical spine         CTA head and neck:  Impression   1. Severe narrowing at the right vertebral artery origin and mild narrowing   at the left vertebral artery origin. 2. Fibrocalcific plaque is noted in bilateral carotid bulbs and proximal   internal carotid arteries without evidence of a flow-limiting stenosis. 3. Unremarkable CTA of the brain. 4. Dental caries with periodontal disease. MRI brain w/o contrast:  Pending    All imaging was personally reviewed     ASSESSMENT/PLAN:   77-year-old male presenting with left arm spasm, jerking and weakness with sensory change. Symptoms of been ongoing for several years but progressed significantly over the last day. Patient is seen today, sitting at edge of bed. He is awake, alert and oriented x4. Speech is clear, language is fluent. Face is symmetric. He is noted to have 4/5 strength in the left upper extremity with decreased  strength on exam.  Right upper extremity and bilateral lower extremities with strength intact. He has decreased sensation to temperature, vibration and pinprick in the left upper extremity. Otherwise sensation is intact and appropriate.   Left upper extremity weakness, spasm, jerking concerning for acute stroke superimposed on hyperglycemia, hypertension  Neuroimaging as above  MRI brain pending  CTA head and neck notes severe right vertebral artery stenosis, mild left vertebral artery stenosis, plaques within bilateral carotid bulbs without evidence of flow-limiting stenosis  Initial glucose 878, now improved in the 150s-160s management per IM  A1c pending  TSH WNL  Continue rosuvastatin  LDL 62  Will initiate antiplaelet pending MRI results  Recommend SBP <140  PT/OT as recommended  Further recommendations pending MRI brain    Patient was discussed with attending neurologist Dr. Galen Mittal       Thank you for allowing us to participate in the care of your patient. If there are any questions regarding evaluation please feel free to contact us. AMADO Veras - CNP, 1/24/2023       ------------------------------------    Attending Note:  I have rounded on this patient with eKy Beverly CNP. I have reviewed the chart and we have discussed this case in detail. The patient was seen and examined by myself. Pertinent labs and imaging have been personally reviewed. Our findings and impressions were discussed with the patient. I concur with the Nurse Practioner's assessment and plan. Was having jerking and spasming of the left upper extremity. This seems to have resolved. Still display some dysdiadochokinesia on the left upper extremity. Could certainly be related to hypoglycemia. An MRI of the brain is pending at this time to rule out stroke. Focal seizures in the differential diagnosis as well. We will order an EEG.     Dorcas Renee,  1/24/2023 7:54 PM

## 2023-01-24 NOTE — CARE COORDINATION
CM spoke with pt to initiate discharge planning. Role of CM explained. Pt does not have insurance.  Pt has PCP.  Pt is from home with his brother and sister. Pt drives, has a dependable vehicle, is employed and is independent. Plan home.  CM contact information given to pt. CM team available as needs arise.

## 2023-01-24 NOTE — PROGRESS NOTES
Physician Progress Note      Tamiko Leung  CSN #:                  [id number]  :                       1960  ADMIT DATE:       2023 5:17 AM  DISCH DATE:  RESPONDING  PROVIDER #:        Lluvia Barrera MD          QUERY TEXT:    Pt admitted with L arm spasms. Noted documentation of DKA Type I on 23   by Dr. Chetna Taylor, endocrinology consultant. If possible, please document in   progress notes and discharge summary:    The medical record reflects the following:  Risk Factors: new onset  Clinical Indicators: H&P indicates, \"Patient initial presentation concerning   for DKA, however anion gap/bicarb/hyperglycemia improved status post IV   fluids. \" New onset diabetes with hyperglycemia. Patient received 1 L normal   saline bolus, insulin R 15 units in total, Ativan 0.5 mg.  Repeat BMP with   sodium 133, bicarb improved to 25 from 18, creatinine improved to 1.1 from 1.2   and glucose improved to 590 from 878, anion gap resolved 10 from 17. Treatment: insulin, labs, IVF, endo consult    Thank you,  Rishabh Bermeo RN  [phone]  Options provided:  -- DKA Type I confirmed present on admission  -- DKA Type I ruled out  -- Other - I will add my own diagnosis  -- Disagree - Not applicable / Not valid  -- Disagree - Clinically unable to determine / Unknown  -- Refer to Clinical Documentation Reviewer    PROVIDER RESPONSE TEXT:    The diagnosis of DKA Type I was confirmed as present on admission.     Query created by: Rosie Shane on 2023 12:33 PM      Electronically signed by:  Lluvia Barrera MD 2023 1:25 PM

## 2023-01-24 NOTE — PROGRESS NOTES
01/24/23 1437   Encounter Summary   Encounter Overview/Reason  Initial Encounter   Service Provided For: Patient   Referral/Consult From: [address] Family members   Last Encounter  01/24/23  (Spiritual Care Visit with prayer)   Complexity of Encounter Moderate   Begin Time 0215   End Time  0248   Total Time Calculated 33 min   Encounter    Type Initial Screen/Assessment   Spiritual/Emotional needs   Type Spiritual Support   Assessment/Intervention/Outcome   Assessment Calm;Coping; Hopeful;Peaceful  (No affilliation with local congreation; large supportive family.)   Intervention Active listening;Nurtured Hope;Prayer (assurance of)/[place];Sustaining Presence/Ministry of presence  (Prayer; positive thinking, meditation)   Outcome Acceptance; Coping;Encouraged;Engaged in conversation;Expressed feelings of Marlen, Peace and/or Love;Expressed Gratitude   Plan and Referrals   Plan/Referrals Continue to visit, (comment)

## 2023-01-24 NOTE — PROGRESS NOTES
In-Patient Progress Note    Patient:  Nestor Sagastume 63 y.o. male MRN: [id number]     Date of Service: 1/24/2023    Hospital Day: 2      Chief complaint: had concerns including Spasms (Left side, started Saturday.) and Muscle Pain (Left arm). Subjective   Patient seen and examined in the morning. Patient states he feels ok. Family members at bedside - sisters all have DM and grandparents had DM. Last PCP visit was in 2019. States he had one episode of Lt. Hand cramp in the morning today. Discussed with Neuro - plan for MRI brain w/o contrast       See ROS    I have reviewed all pertinent PMHx, PSHx, FamHx, SocialHx, medications, and allergies and updated history as appropriate. I have reviewed images as appropriate. Assessment and Plan   Nestor Sagastume, a 63 y.o. male, with no known past medical history was admitted on 1/23/2023 with complaints of had concerns including Spasms (Left side, started Saturday.) and Muscle Pain (Left arm). Assessment and Plan:  Left arm twitching/spasm/jerking:  Left hand with decreased  strength and numbness:  -No clear previous history or inciting cause  -We will follow infectious work-up  -Neurochecks  -Telemetry  -CT head/CT C-spine/CTA as below  -Neurology on board - discussed with neurology - plan for MRI brain w/o contrast         -TSH normal, A1c pending, ASCVD 32.3%    DKA with New onset diabetes mellitus with hyperglycemia:  -Patient initial presentation concerning for DKA, however anion gap (17) /bicarb (18) /hyperglycemia (878 mg/dl) and Betahydroxybutyrate (>20.8) improved status post IV fluids.   Case discussed with ICU attending and deemed stable for medicine for  -Continue IV NS at 125 mL/h  -A1c pending  -Endocrine on board - F/U recs    HLD       - The 10-year ASCVD risk score (Geovany SOOD, et al., 2019) is: 32.3%    Values used to calculate the score:      Age: 63 years      Sex: Male      Is Non- : No      Diabetic: Yes      Tobacco smoker: Yes      Systolic Blood Pressure: 377 mmHg      Is BP treated: No      HDL Cholesterol: 25 MG/DL      Total Cholesterol: 141 MG/DL     - Start on high intensity statin - rosuvastatin 40mg daily     Hyponatremia 2/2 likely pseudohyponatremia 2/2 translocational - Resolved     Elevated Cr  -Creatinine 1.2 on admission normal  -Improving status post IV fluids  -Monitor BMP and avoid nephrotoxic medications    Transfer to med-surg with tele    # Peptic ulcer prophylaxis: -   # DVT Prophylaxis: Enoxaparin   #CODE STATUS: Full      Current living situation: Home  Expected Disposition: TBD  Estimated discharge date: 1-2 days. MRI brain pending and neuro clearance pending. Review of System     Review of Systems   Constitutional:  Negative for appetite change, chills, fatigue, fever and unexpected weight change. HENT:  Negative for sinus pressure, sinus pain and sore throat. Eyes:  Negative for pain, redness and itching. Respiratory:  Negative for cough, chest tightness, shortness of breath and wheezing. Cardiovascular:  Negative for chest pain, palpitations and leg swelling. Gastrointestinal:  Negative for abdominal pain, constipation, diarrhea, nausea and vomiting. Endocrine: Negative for polydipsia, polyphagia and polyuria. Genitourinary:  Negative for decreased urine volume, difficulty urinating, dysuria, frequency, hematuria and urgency. Musculoskeletal:  Negative for arthralgias, back pain and myalgias. Skin:  Negative for pallor and rash. Neurological:  Positive for weakness (Lt. arm with spasm). Negative for dizziness, tremors, seizures, syncope, light-headedness, numbness and headaches. Psychiatric/Behavioral:  Negative for agitation and confusion. I have reviewed all pertinent PMHx, PSHx, FamHx, SocialHx, medications, and allergies and updated history as appropriate.     Physical Exam   VITAL SIGNS:  /78   Pulse 62   Temp 97.5 °F (36.4 °C) (Oral) Resp 20   Ht 6' (1.829 m)   Wt 188 lb 6.4 oz (85.5 kg)   SpO2 96%   BMI 25.55 kg/m²   Tmax over 24 hours:  Temp (24hrs), Av.5 °F (36.4 °C), Min:97.4 °F (36.3 °C), Max:97.5 °F (36.4 °C)      No data found. Intake/Output Summary (Last 24 hours) at 2023 1450  Last data filed at 2023 0616  Gross per 24 hour   Intake 1957.44 ml   Output --   Net 1957.44 ml     Wt Readings from Last 2 Encounters:   23 188 lb 6.4 oz (85.5 kg)   04/15/19 200 lb (90.7 kg)     Body mass index is 25.55 kg/m². Physical Exam  Vitals and nursing note reviewed. Constitutional:       General: He is not in acute distress. Appearance: He is overweight. He is not ill-appearing, toxic-appearing or diaphoretic. HENT:      Head: Normocephalic and atraumatic. Right Ear: External ear normal.      Left Ear: External ear normal.      Nose: Nose normal.      Mouth/Throat:      Mouth: Mucous membranes are moist.      Pharynx: Oropharynx is clear. Eyes:      General: No scleral icterus. Right eye: No discharge. Left eye: No discharge. Conjunctiva/sclera: Conjunctivae normal.      Pupils: Pupils are equal, round, and reactive to light. Cardiovascular:      Rate and Rhythm: Normal rate and regular rhythm. Pulses: Normal pulses. Heart sounds: Normal heart sounds. No murmur heard. No friction rub. No gallop. Pulmonary:      Effort: Pulmonary effort is normal. No respiratory distress. Breath sounds: Normal breath sounds. No stridor. No wheezing, rhonchi or rales. Abdominal:      General: Bowel sounds are normal. There is no distension. Palpations: Abdomen is soft. There is no mass. Tenderness: There is no abdominal tenderness. There is no guarding or rebound. Hernia: No hernia is present. Musculoskeletal:      Right lower leg: No edema. Left lower leg: No edema.       Comments: LUE 4/5   Skin:     Capillary Refill: Capillary refill takes less than 2 seconds. Neurological:      Mental Status: He is alert. Current Medications      insulin glargine  30 Units SubCUTAneous Nightly    insulin lispro  10 Units SubCUTAneous TID     nicotine  1 patch TransDERmal Daily    sodium chloride flush  5-40 mL IntraVENous 2 times per day    enoxaparin  40 mg SubCUTAneous Daily    insulin lispro  0-8 Units SubCUTAneous TID     insulin lispro  0-8 Units SubCUTAneous 2 times per day         Labs and Imaging Studies   Laboratory findings:  CT HEAD WO CONTRAST    Result Date: 1/23/2023  EXAMINATION: CT OF THE HEAD WITHOUT CONTRAST; CT OF THE CERVICAL SPINE WITHOUT CONTRAST 1/23/2023 6:04 am TECHNIQUE: CT of the head was performed without the administration of intravenous contrast. Automated exposure control, iterative reconstruction, and/or weight based adjustment of the mA/kV was utilized to reduce the radiation dose to as low as reasonably achievable.; CT of the cervical spine was performed without the administration of intravenous contrast. Multiplanar reformatted images are provided for review. Automated exposure control, iterative reconstruction, and/or weight based adjustment of the mA/kV was utilized to reduce the radiation dose to as low as reasonably achievable. COMPARISON: None. HISTORY: ORDERING SYSTEM PROVIDED HISTORY: left arm spasm TECHNOLOGIST PROVIDED HISTORY: Reason for exam:->left arm spasm Has a \"code stroke\" or \"stroke alert\" been called? ->No Decision Support Exception - unselect if not a suspected or confirmed emergency medical condition->Emergency Medical Condition (MA) Reason for Exam: left arm spasm FINDINGS: BRAIN/VENTRICLES: There is no acute intracranial hemorrhage, mass effect or midline shift. No abnormal extra-axial fluid collection. The gray-white differentiation is maintained without evidence of an acute infarct. There is no evidence of hydrocephalus. ORBITS: The visualized portion of the orbits demonstrate no acute abnormality. SINUSES: The visualized paranasal sinuses and mastoid air cells demonstrate no acute abnormality. SOFT TISSUES/SKULL:  No acute abnormality of the visualized skull or soft tissues. CT cervical spine: There is degenerative change in the cervical spine at multiple levels with decreased disc space height, osteophytosis, and posterior spondylitic ridging. This is most pronounced at C4-5, C5-6, and C6-7. No acute fracture or dislocation is seen. No acute intracranial abnormality. Degenerative change in the cervical spine     CT CERVICAL SPINE WO CONTRAST    Result Date: 1/23/2023  EXAMINATION: CT OF THE HEAD WITHOUT CONTRAST; CT OF THE CERVICAL SPINE WITHOUT CONTRAST 1/23/2023 6:04 am TECHNIQUE: CT of the head was performed without the administration of intravenous contrast. Automated exposure control, iterative reconstruction, and/or weight based adjustment of the mA/kV was utilized to reduce the radiation dose to as low as reasonably achievable.; CT of the cervical spine was performed without the administration of intravenous contrast. Multiplanar reformatted images are provided for review. Automated exposure control, iterative reconstruction, and/or weight based adjustment of the mA/kV was utilized to reduce the radiation dose to as low as reasonably achievable. COMPARISON: None. HISTORY: ORDERING SYSTEM PROVIDED HISTORY: left arm spasm TECHNOLOGIST PROVIDED HISTORY: Reason for exam:->left arm spasm Has a \"code stroke\" or \"stroke alert\" been called? ->No Decision Support Exception - unselect if not a suspected or confirmed emergency medical condition->Emergency Medical Condition (MA) Reason for Exam: left arm spasm FINDINGS: BRAIN/VENTRICLES: There is no acute intracranial hemorrhage, mass effect or midline shift. No abnormal extra-axial fluid collection. The gray-white differentiation is maintained without evidence of an acute infarct. There is no evidence of hydrocephalus.  ORBITS: The visualized portion of the orbits demonstrate no acute abnormality. SINUSES: The visualized paranasal sinuses and mastoid air cells demonstrate no acute abnormality. SOFT TISSUES/SKULL:  No acute abnormality of the visualized skull or soft tissues. CT cervical spine: There is degenerative change in the cervical spine at multiple levels with decreased disc space height, osteophytosis, and posterior spondylitic ridging. This is most pronounced at C4-5, C5-6, and C6-7. No acute fracture or dislocation is seen. No acute intracranial abnormality. Degenerative change in the cervical spine     XR CHEST PORTABLE    Result Date: 1/23/2023  EXAMINATION: ONE XRAY VIEW OF THE CHEST 1/23/2023 5:56 am COMPARISON: None. HISTORY: ORDERING SYSTEM PROVIDED HISTORY: left arm pain TECHNOLOGIST PROVIDED HISTORY: Reason for exam:->left arm pain Reason for Exam: Spasms; Muscle Pain Additional signs and symptoms: left arm pain, spasms FINDINGS: There is no consolidation, pleural effusion, or pneumothorax. Calcified granulomas at the left apex and in both lower lungs. Cardiac silhouette is not enlarged and there is no pulmonary vascular congestion. Mediastinum and jersey are within normal limits. Bony thorax is unremarkable. No acute cardiopulmonary process. CTA HEAD NECK W CONTRAST    Result Date: 1/23/2023  EXAMINATION: CTA OF THE HEAD AND NECK WITH CONTRAST 1/23/2023 10:46 am: TECHNIQUE: CTA of the head and neck was performed with the administration of intravenous contrast. Multiplanar reformatted images are provided for review. MIP images are provided for review. Stenosis of the internal carotid arteries measured using NASCET criteria. Automated exposure control, iterative reconstruction, and/or weight based adjustment of the mA/kV was utilized to reduce the radiation dose to as low as reasonably achievable. COMPARISON: Head CT on 01/23/2023.  HISTORY: ORDERING SYSTEM PROVIDED HISTORY: left arm weakness TECHNOLOGIST PROVIDED HISTORY: Reason for exam:->left arm weakness Has a \"code stroke\" or \"stroke alert\" been called? ->No Decision Support Exception - unselect if not a suspected or confirmed emergency medical condition->Emergency Medical Condition (MA) Reason for Exam: left arm weakness Relevant Medical/Surgical History: [id number] FINDINGS: CTA NECK: AORTIC ARCH/ARCH VESSELS: Vascular calcifications are noted along the aortic arch. There is a normal branch pattern of the aortic arch. The innominate and subclavian arteries are patent without evidence of a flow-limiting stenosis. CAROTID ARTERIES: The right common carotid artery is normal in caliber. Fibrocalcific plaque is noted in the right carotid bulb and proximal right internal carotid artery. No flow-limiting stenosis or dissection. There is mild fibrofatty plaque in the left common carotid artery with areas of minimal narrowing. Fibrocalcific plaque is noted in the proximal left internal carotid artery without evidence of a flow-limiting stenosis. VERTEBRAL ARTERIES: There is severe narrowing at the right vertebral artery origin. Mild narrowing at the left vertebral artery origin. No dissection. SOFT TISSUES: There are calcified granulomas in the lungs. No superior mediastinal adenopathy. The thyroid gland is normal in appearance. Submandibular and parotid glands are unremarkable. The parapharyngeal fat spaces are preserved. No cervical or supraclavicular adenopathy. BONES: There are dental caries with periodontal disease. Degenerative changes are noted in the spine. No fracture. CTA HEAD: ANTERIOR CIRCULATION: Vascular calcifications are noted in the supraclinoid ICAs. The left A1 segment is hypoplastic. There is an azygous A2 segment. The middle cerebral arteries are unremarkable. POSTERIOR CIRCULATION: The P-comms are patent. The basilar artery is patent. There is a complete fetal origin of the left posterior cerebral artery.  There is a near complete fetal origin of the right posterior cerebral artery. OTHER: No dural venous sinus thrombosis on this non-dedicated study. BRAIN: No areas of abnormal enhancement. No midline shift. 1. Severe narrowing at the right vertebral artery origin and mild narrowing at the left vertebral artery origin. 2. Fibrocalcific plaque is noted in bilateral carotid bulbs and proximal internal carotid arteries without evidence of a flow-limiting stenosis. 3. Unremarkable CTA of the brain. 4. Dental caries with periodontal disease.        Recent Results (from the past 24 hour(s))   Basic Metabolic Panel    Collection Time: 01/23/23  4:16 PM   Result Value Ref Range    Sodium 139 135 - 145 MMOL/L    Potassium 4.3 3.5 - 5.1 MMOL/L    Chloride 102 99 - 110 mMol/L    CO2 27 21 - 32 MMOL/L    Anion Gap 10 4 - 16    BUN 26 (H) 6 - 23 MG/DL    Creatinine 1.2 0.9 - 1.3 MG/DL    Est, Glom Filt Rate >60 >60 mL/min/1.73m2    Glucose 369 (H) 70 - 99 MG/DL    Calcium 9.7 8.3 - 10.6 MG/DL   Troponin    Collection Time: 01/23/23  4:16 PM   Result Value Ref Range    Troponin T <0.010 <0.01 NG/ML   POCT Glucose    Collection Time: 01/23/23  4:39 PM   Result Value Ref Range    POC Glucose 223 (H) 70 - 99 MG/DL   POCT Glucose    Collection Time: 01/23/23  8:03 PM   Result Value Ref Range    POC Glucose 71 70 - 99 MG/DL   POCT Glucose    Collection Time: 01/23/23 10:11 PM   Result Value Ref Range    POC Glucose 71 70 - 99 MG/DL   POCT Glucose    Collection Time: 01/24/23  2:06 AM   Result Value Ref Range    POC Glucose 157 (H) 70 - 99 MG/DL   Comprehensive Metabolic Panel w/ Reflex to MG    Collection Time: 01/24/23  3:12 AM   Result Value Ref Range    Sodium 140 135 - 145 MMOL/L    Potassium 4.4 3.5 - 5.1 MMOL/L    Chloride 108 99 - 110 mMol/L    CO2 23 21 - 32 MMOL/L    BUN 24 (H) 6 - 23 MG/DL    Creatinine 1.0 0.9 - 1.3 MG/DL    Est, Glom Filt Rate >60 >60 mL/min/1.73m2    Glucose 164 (H) 70 - 99 MG/DL    Calcium 8.7 8.3 - 10.6 MG/DL    Albumin 3.2 (L) 3.4 - 5.0 GM/DL Total Protein 5.2 (L) 6.4 - 8.2 GM/DL    Total Bilirubin 0.2 0.0 - 1.0 MG/DL    ALT 9 (L) 10 - 40 U/L    AST 10 (L) 15 - 37 IU/L    Alkaline Phosphatase 63 40 - 128 IU/L    Anion Gap 9 4 - 16   CBC with Auto Differential    Collection Time: 01/24/23  3:12 AM   Result Value Ref Range    WBC 11.7 (H) 4.0 - 10.5 K/CU MM    RBC 3.80 (L) 4.6 - 6.2 M/CU MM    Hemoglobin 12.5 (L) 13.5 - 18.0 GM/DL    Hematocrit 36.5 (L) 42 - 52 %    MCV 96.1 78 - 100 FL    MCH 32.9 (H) 27 - 31 PG    MCHC 34.2 32.0 - 36.0 %    RDW 13.5 11.7 - 14.9 %    Platelets 634 760 - 774 K/CU MM    MPV 12.2 (H) 7.5 - 11.1 FL    Differential Type AUTOMATED DIFFERENTIAL     Segs Relative 60.1 36 - 66 %    Lymphocytes % 27.0 24 - 44 %    Monocytes % 10.2 (H) 0 - 4 %    Eosinophils % 1.2 0 - 3 %    Basophils % 0.9 0 - 1 %    Segs Absolute 7.1 K/CU MM    Lymphocytes Absolute 3.2 K/CU MM    Monocytes Absolute 1.2 K/CU MM    Eosinophils Absolute 0.1 K/CU MM    Basophils Absolute 0.1 K/CU MM    Nucleated RBC % 0.0 %    Total Nucleated RBC 0.0 K/CU MM    TRC 0.0749 K/CU MM    Total Immature Neutrophil 0.07 K/CU MM    Immature Neutrophil % 0.6 (H) 0 - 0.43 %   Lipid Panel    Collection Time: 01/24/23  3:12 AM   Result Value Ref Range    Triglycerides 268 (H) <150 MG/DL    Cholesterol 141 <200 MG/DL    HDL 25 (L) >40 MG/DL    LDL Calculated 62 <100 MG/DL   POCT Glucose    Collection Time: 01/24/23  7:32 AM   Result Value Ref Range    POC Glucose 239 (H) 70 - 99 MG/DL   POCT Glucose    Collection Time: 01/24/23 11:28 AM   Result Value Ref Range    POC Glucose 216 (H) 70 - 99 MG/DL           Electronically signed by Francois Cole MD on 1/24/2023 at 2:50 PM

## 2023-01-25 LAB
ANION GAP SERPL CALCULATED.3IONS-SCNC: 8 MMOL/L (ref 4–16)
BASOPHILS ABSOLUTE: 0.1 K/CU MM
BASOPHILS RELATIVE PERCENT: 1.2 % (ref 0–1)
BUN BLDV-MCNC: 14 MG/DL (ref 6–23)
CALCIUM SERPL-MCNC: 8.9 MG/DL (ref 8.3–10.6)
CHLORIDE BLD-SCNC: 108 MMOL/L (ref 99–110)
CO2: 23 MMOL/L (ref 21–32)
CREAT SERPL-MCNC: 1 MG/DL (ref 0.9–1.3)
DIFFERENTIAL TYPE: ABNORMAL
EOSINOPHILS ABSOLUTE: 0.1 K/CU MM
EOSINOPHILS RELATIVE PERCENT: 1.5 % (ref 0–3)
ESTIMATED AVERAGE GLUCOSE: ABNORMAL MG/DL
GFR SERPL CREATININE-BSD FRML MDRD: >60 ML/MIN/1.73M2
GLUCOSE BLD-MCNC: 169 MG/DL (ref 70–99)
GLUCOSE BLD-MCNC: 169 MG/DL (ref 70–99)
GLUCOSE BLD-MCNC: 191 MG/DL (ref 70–99)
GLUCOSE BLD-MCNC: 194 MG/DL (ref 70–99)
GLUCOSE BLD-MCNC: 221 MG/DL (ref 70–99)
GLUCOSE BLD-MCNC: 82 MG/DL (ref 70–99)
HBA1C MFR BLD: >16.5 % (ref 4.2–6.3)
HCT VFR BLD CALC: 40.8 % (ref 42–52)
HEMOGLOBIN: 13.3 GM/DL (ref 13.5–18)
IMMATURE NEUTROPHIL %: 0.7 % (ref 0–0.43)
LYMPHOCYTES ABSOLUTE: 2.4 K/CU MM
LYMPHOCYTES RELATIVE PERCENT: 26.6 % (ref 24–44)
MAGNESIUM: 1.8 MG/DL (ref 1.8–2.4)
MCH RBC QN AUTO: 32.3 PG (ref 27–31)
MCHC RBC AUTO-ENTMCNC: 32.6 % (ref 32–36)
MCV RBC AUTO: 99 FL (ref 78–100)
MONOCYTES ABSOLUTE: 0.9 K/CU MM
MONOCYTES RELATIVE PERCENT: 9.7 % (ref 0–4)
NUCLEATED RBC %: 0 %
PDW BLD-RTO: 14 % (ref 11.7–14.9)
PHOSPHORUS: 2.4 MG/DL (ref 2.5–4.9)
PLATELET # BLD: 233 K/CU MM (ref 140–440)
PMV BLD AUTO: 12.3 FL (ref 7.5–11.1)
POTASSIUM SERPL-SCNC: 4.4 MMOL/L (ref 3.5–5.1)
RBC # BLD: 4.12 M/CU MM (ref 4.6–6.2)
SEGMENTED NEUTROPHILS ABSOLUTE COUNT: 5.5 K/CU MM
SEGMENTED NEUTROPHILS RELATIVE PERCENT: 60.3 % (ref 36–66)
SODIUM BLD-SCNC: 139 MMOL/L (ref 135–145)
TOTAL IMMATURE NEUTOROPHIL: 0.06 K/CU MM
TOTAL NUCLEATED RBC: 0 K/CU MM
WBC # BLD: 9.2 K/CU MM (ref 4–10.5)

## 2023-01-25 PROCEDURE — 97161 PT EVAL LOW COMPLEX 20 MIN: CPT

## 2023-01-25 PROCEDURE — 2580000003 HC RX 258: Performed by: INTERNAL MEDICINE

## 2023-01-25 PROCEDURE — 97530 THERAPEUTIC ACTIVITIES: CPT

## 2023-01-25 PROCEDURE — 82962 GLUCOSE BLOOD TEST: CPT

## 2023-01-25 PROCEDURE — 97116 GAIT TRAINING THERAPY: CPT

## 2023-01-25 PROCEDURE — 95819 EEG AWAKE AND ASLEEP: CPT

## 2023-01-25 PROCEDURE — 84100 ASSAY OF PHOSPHORUS: CPT

## 2023-01-25 PROCEDURE — 95816 EEG AWAKE AND DROWSY: CPT | Performed by: STUDENT IN AN ORGANIZED HEALTH CARE EDUCATION/TRAINING PROGRAM

## 2023-01-25 PROCEDURE — 2500000003 HC RX 250 WO HCPCS: Performed by: INTERNAL MEDICINE

## 2023-01-25 PROCEDURE — 6360000002 HC RX W HCPCS: Performed by: STUDENT IN AN ORGANIZED HEALTH CARE EDUCATION/TRAINING PROGRAM

## 2023-01-25 PROCEDURE — 1200000000 HC SEMI PRIVATE

## 2023-01-25 PROCEDURE — 85025 COMPLETE CBC W/AUTO DIFF WBC: CPT

## 2023-01-25 PROCEDURE — 6370000000 HC RX 637 (ALT 250 FOR IP): Performed by: INTERNAL MEDICINE

## 2023-01-25 PROCEDURE — 97165 OT EVAL LOW COMPLEX 30 MIN: CPT

## 2023-01-25 PROCEDURE — 80048 BASIC METABOLIC PNL TOTAL CA: CPT

## 2023-01-25 PROCEDURE — 83735 ASSAY OF MAGNESIUM: CPT

## 2023-01-25 PROCEDURE — 94761 N-INVAS EAR/PLS OXIMETRY MLT: CPT

## 2023-01-25 PROCEDURE — 2580000003 HC RX 258: Performed by: STUDENT IN AN ORGANIZED HEALTH CARE EDUCATION/TRAINING PROGRAM

## 2023-01-25 PROCEDURE — 6370000000 HC RX 637 (ALT 250 FOR IP): Performed by: EMERGENCY MEDICINE

## 2023-01-25 PROCEDURE — 36415 COLL VENOUS BLD VENIPUNCTURE: CPT

## 2023-01-25 RX ORDER — ROSUVASTATIN CALCIUM 40 MG/1
40 TABLET, COATED ORAL NIGHTLY
Qty: 30 TABLET | Refills: 0 | Status: SHIPPED | OUTPATIENT
Start: 2023-01-25

## 2023-01-25 RX ORDER — INSULIN LISPRO 100 [IU]/ML
5 INJECTION, SOLUTION INTRAVENOUS; SUBCUTANEOUS
Qty: 5 ADJUSTABLE DOSE PRE-FILLED PEN SYRINGE | Refills: 0 | Status: SHIPPED | OUTPATIENT
Start: 2023-01-25 | End: 2023-01-25 | Stop reason: HOSPADM

## 2023-01-25 RX ORDER — LANCETS 30 GAUGE
1 EACH MISCELLANEOUS 3 TIMES DAILY
Qty: 200 EACH | Refills: 0 | Status: SHIPPED | OUTPATIENT
Start: 2023-01-25

## 2023-01-25 RX ORDER — SENNA PLUS 8.6 MG/1
1 TABLET ORAL 2 TIMES DAILY
Qty: 60 TABLET | Refills: 0 | Status: SHIPPED | OUTPATIENT
Start: 2023-01-25 | End: 2023-02-24

## 2023-01-25 RX ORDER — TIZANIDINE 4 MG/1
4 TABLET ORAL EVERY 6 HOURS PRN
Qty: 12 TABLET | Refills: 0 | Status: SHIPPED | OUTPATIENT
Start: 2023-01-25 | End: 2023-01-28

## 2023-01-25 RX ORDER — INSULIN GLARGINE 100 [IU]/ML
30 INJECTION, SOLUTION SUBCUTANEOUS NIGHTLY
Qty: 5 ADJUSTABLE DOSE PRE-FILLED PEN SYRINGE | Refills: 0 | Status: SHIPPED | OUTPATIENT
Start: 2023-01-25 | End: 2023-01-25 | Stop reason: HOSPADM

## 2023-01-25 RX ADMIN — SODIUM CHLORIDE: 9 INJECTION, SOLUTION INTRAVENOUS at 09:25

## 2023-01-25 RX ADMIN — SODIUM PHOSPHATE, MONOBASIC, MONOHYDRATE AND SODIUM PHOSPHATE, DIBASIC, ANHYDROUS 10 MMOL: 276; 142 INJECTION, SOLUTION INTRAVENOUS at 18:03

## 2023-01-25 RX ADMIN — Medication 10 ML: at 09:00

## 2023-01-25 RX ADMIN — INSULIN LISPRO 10 UNITS: 100 INJECTION, SOLUTION INTRAVENOUS; SUBCUTANEOUS at 14:07

## 2023-01-25 RX ADMIN — INSULIN LISPRO 10 UNITS: 100 INJECTION, SOLUTION INTRAVENOUS; SUBCUTANEOUS at 18:41

## 2023-01-25 RX ADMIN — SODIUM CHLORIDE: 9 INJECTION, SOLUTION INTRAVENOUS at 00:45

## 2023-01-25 RX ADMIN — INSULIN LISPRO 10 UNITS: 100 INJECTION, SOLUTION INTRAVENOUS; SUBCUTANEOUS at 10:18

## 2023-01-25 RX ADMIN — ENOXAPARIN SODIUM 40 MG: 100 INJECTION SUBCUTANEOUS at 09:25

## 2023-01-25 RX ADMIN — INSULIN LISPRO 2 UNITS: 100 INJECTION, SOLUTION INTRAVENOUS; SUBCUTANEOUS at 21:36

## 2023-01-25 RX ADMIN — ROSUVASTATIN CALCIUM 40 MG: 20 TABLET, COATED ORAL at 21:31

## 2023-01-25 RX ADMIN — INSULIN GLARGINE 30 UNITS: 100 INJECTION, SOLUTION SUBCUTANEOUS at 21:32

## 2023-01-25 ASSESSMENT — ENCOUNTER SYMPTOMS
EYE REDNESS: 0
EYE ITCHING: 0
COUGH: 0
WHEEZING: 0
BACK PAIN: 0
SINUS PRESSURE: 0
ABDOMINAL PAIN: 0
CHEST TIGHTNESS: 0
VOMITING: 0
SORE THROAT: 0
SINUS PAIN: 0
NAUSEA: 0
CONSTIPATION: 0
SHORTNESS OF BREATH: 0
DIARRHEA: 0
EYE PAIN: 0

## 2023-01-25 NOTE — PROGRESS NOTES
Occupational Therapy    [facility] [place]  Keshawn Nguyen, 1960, 4123/[id number]-A, 1/25/2023    History  [place]:  The primary encounter diagnosis was Diabetes mellitus, new onset ([address] Diagnoses of Spasm of muscle, Left arm weakness, Hypoglycemia, and Diabetic ketosis ([address] were also pertinent to this visit. Patient  has no past medical history on file. Patient  has no past surgical history on file. Subjective:  Patient states:  \"I get a little lightheaded when I change directions\". Pain:  None. Communication with other providers:  Handoff to RN  Restrictions: General Precautions, Fall Risk    Home Setup/Prior level of function  Social/Functional History  Lives With: Family  Type of Home: House  Home Layout: Two level  Home Access: Stairs to enter without rails  Entrance Stairs - Number of Steps: 1  Bathroom Shower/Tub: Tub/Shower unit  Bathroom Toilet: Standard  Bathroom Accessibility: Accessible  Has the patient had two or more falls in the past year or any fall with injury in the past year?: No  ADL Assistance: Independent  Homemaking Assistance: Independent  Homemaking Responsibilities: Yes  Ambulation Assistance: Independent  Transfer Assistance: Independent  Active : Yes  Occupation: Full time employment  Type of Occupation:     Examination of body systems (includes body structures/functions, activity/participation limitations):  Observation:  Supine in bed upon arrival, agreeable to therapy   Vision:  Glasses  Hearing:  [facility]  Cardiopulmonary:  No 02 needs. BP after performing functional mobility: 154/80; Pt states he normally runs high      Body Systems and functions:  ROM R/L:  WFL.     Strength R/L:  5/5,   Sensation: WFL  Tone: Normal  Coordination: WFL  Perception: WNL    Activities of Daily Living (ADLs):  Feeding: Independent  Grooming: SBA (washing hands/face w/ warm washcloth)  UB bathing: Independent  LB bathing: CGA  UB dressing: Independent  LB dressing: CGA  Toileting: CGA    Cognitive and Psychosocial Functioning:  Overall cognitive status: WNL  Affect: Normal        Mobility:  Supine to sit:  Supervision  Transfers: CGA from EOB up to stand  Sitting balance:  Independent. Standing balance:  CGA. Functional Mobility CGA ~65 feet w/ 4 LOB during turns requiring CGA to correct  Toilet/Shower Transfers: DNT             AM-PAC Daily Activity Inpatient   How much help for putting on and taking off regular lower body clothing?: A Little  How much help for Bathing?: A Little  How much help for Toileting?: A Little  How much help for putting on and taking off regular upper body clothing?: None  How much help for taking care of personal grooming?: A Little  How much help for eating meals?: None  AM-[facility] Inpatient Daily Activity Raw Score: 20  AM-PAC Inpatient ADL T-Scale Score : 42.03  ADL Inpatient CMS 0-100% Score: 38.32  ADL Inpatient CMS G-Code Modifier : CJ    Treatment:  Therapeutic Activity Training:   Therapeutic activity training was instructed today. Cues were given for safety, sequence, UE/LE placement, awareness, and balance. Activities performed today included bed mobility training, sup-sit, sit-stand, functional mobility, stand to sit    Safety: patient left in bed with bed alarm, call light within reach, RN notified, gait belt used. Assessment:  Pt is a 62 yo male admitted from home for DKA. Pt at baseline is Independent for ADLs Independent for high level IADLs and Independent for functional transfers/mobility. Pt currently presents w/ min deficits in ADL and high level IADL independence, functional activity tolerance, dynamic sitting and standing balance and tolerance and functional transfers.  Pt would benefit from continued acute care OT services w/ discharge to home w/ home health OT S1 w/ assist PRN vs Outpatient depending if dizziness persists  Complexity: Low  Prognosis: Good, no significant barriers to participation at this time. Occupational Therapy Plan  Times Per Week: 1x+  Times Per Day:  Once a day  Current Treatment Recommendations: Strengthening, ROM, Balance training, Functional mobility training, Endurance training, Pain management, Patient/Caregiver education & training, Safety education & training, Equipment evaluation, education, & procurement, Self-Care / ADL, Home management training, Positioning     Equipment: defer    Goals:  Pt goal: go home  Time Frame for STGs: discharge  Goal 1: Pt will perform UE ADLs Independent  Goal 2: Pt will perform LE ADLs Independent  Goal 3: Pt will perform toileting Independent  Goal 4: Pt will perform functional transfer Independent  Goal 5: Pt will perform functional mobility Independent  Goal 6: Pt will perform therex/theract in order to increase functional activity tolerance and dynamic standing balance    Treatment plan:  Pt will perform functional task in stand reaching in all 3 planes in order to increase dynamic standing balance and functional activity tolerance    Recommendations for NURSING activity: Up to chair for all 3 meals and up to standard commode for all toileting needs    Time:   Time in: 0855  Time out: 0913  Timed treatment minutes: 8 minutes  Total time: 18 minutes    Electronically signed by:    Elmer GUTIERREZ/L [id number]  10:46 AM,1/25/2023

## 2023-01-25 NOTE — PROGRESS NOTES
4 Eyes Skin Assessment     NAME:  Nate Cantrell  YOB: 1960  MEDICAL RECORD NUMBER:  [id number]    The patient is being assess for  Transfer to New Unit    I agree that 2 RN's have performed a thorough Head to Toe Skin Assessment on the patient. ALL assessment sites listed below have been assessed. Areas assessed by both nurses:    Head, Face, Ears, Shoulders, Back, Chest, Arms, Elbows, Hands, Sacrum. Buttock, Coccyx, Ischium, and Legs. Feet and Heels        Does the Patient have a Wound?  No noted wound(s)       Jordan Prevention initiated:  Yes   Wound Care Orders initiated:  NA    Pressure Injury (Stage 3,4, Unstageable, DTI, NWPT, and Complex wounds) if present place consult order under [de-identified] NA    New and Established Ostomies if present place consult order under : NA      Nurse 1 eSignature: Electronically signed by Pat Curiel RN on 1/24/23 at 10:09 PM EST    **SHARE this note so that the co-signing nurse is able to place an eSignature**    Nurse 2 eSignature: Electronically signed by Steffi Hannah LPN on 7/76/70 at 39:24 AM EST

## 2023-01-25 NOTE — PROGRESS NOTES
Report called to Sanket Sanchez RN. Pt informed of transfer to  . Alert & oriented. Ambulatory to BR. S.

## 2023-01-25 NOTE — CARE COORDINATION
Received call from [facility] for help with medications.  Patient is new diabetic and has insulin, glucose meter, supplies and a couple oral medications.  I was asked to cover the rosuvatatin and tizandine.  Patient will get the insulin and supplies at [facility].    1x voucher faxed and patient placed on flagged list.

## 2023-01-25 NOTE — PROGRESS NOTES
Outpatient Pharmacy Progress Note for Meds-to-Beds    Total number of Prescriptions Filled: 2  The following medications were dispensed to the patient during the discharge process:  Tizanidine  Rosuvastatin    Additional Documentation:  Medication(s) were delivered to the patient's room prior to discharge  Counseled patient on diabetes  Medications covered by Med [employer]  Patient unable to afford Lantus due to lack of insurance  Insulin Cheaper at [facility]      Thank you for letting us serve your patients.   [address] [facility]    [address] [address] [facility] [address] [facility]    Phone: [phone]    Fax: [phone]

## 2023-01-25 NOTE — PROGRESS NOTES
Progress Note( Dr. Anastasia Suárez)  1/24/2023  Subjective:   Admit Date: 1/23/2023  PCP: AMADO Vivas CNP    Admitted For : Left arm twitching and spasms patient was hyperglycemic with possible DKA    Consulted For: Better control of blood glucose    Interval History: Feels much better this morning  Blood glucoses a lot better    Denies any chest pains,   Denies SOB . Denies nausea or vomiting. No new bowel or bladder symptoms. Intake/Output Summary (Last 24 hours) at 1/24/2023 2303  Last data filed at 1/24/2023 0616  Gross per 24 hour   Intake 1957.44 ml   Output --   Net 1957.44 ml       DATA    CBC:   Recent Labs     01/23/23  0532 01/24/23  0312   WBC 11.9* 11.7*   HGB 15.3 12.5*    206    CMP:  Recent Labs     01/23/23  0532 01/23/23  1034 01/23/23  1616 01/24/23  0312   * 133* 139 140   K 5.0 4.6 4.3 4.4   CL 90* 98* 102 108   CO2 18* 25 27 23   BUN 34* 30* 26* 24*   CREATININE 1.2 1.1 1.2 1.0   CALCIUM 9.3 8.9 9.7 8.7   PROT 6.7  --   --  5.2*   LABALBU 4.1  --   --  3.2*   BILITOT 0.1  --   --  0.2   ALKPHOS 114  --   --  63   AST 7*  --   --  10*   ALT 7*  --   --  9*     Lipids:   Lab Results   Component Value Date/Time    CHOL 141 01/24/2023 03:12 AM    HDL 25 01/24/2023 03:12 AM    TRIG 268 01/24/2023 03:12 AM     Glucose:  Recent Labs     01/24/23  1128 01/24/23  1622 01/24/23  1944   POCGLU 216* 87 273*     PvpciasodeS9V:No results found for: LABA1C  High Sensitivity TSH:   Lab Results   Component Value Date/Time    TSHHS 1.520 01/23/2023 10:34 AM     Free T3: No results found for: FT3  Free T4:No results found for: T4FREE    MRI BRAIN WO CONTRAST   Final Result   1. No acute intracranial abnormality. No acute infarction. 2. Mild microangiopathic change. RECOMMENDATIONS:   Unavailable         CTA HEAD NECK W CONTRAST   Final Result   1. Severe narrowing at the right vertebral artery origin and mild narrowing   at the left vertebral artery origin.    2. Fibrocalcific plaque is noted in bilateral carotid bulbs and proximal   internal carotid arteries without evidence of a flow-limiting stenosis. 3. Unremarkable CTA of the brain. 4. Dental caries with periodontal disease. CT CERVICAL SPINE WO CONTRAST   Final Result   No acute intracranial abnormality. Degenerative change in the cervical spine         CT HEAD WO CONTRAST   Final Result   No acute intracranial abnormality. Degenerative change in the cervical spine         XR CHEST PORTABLE   Final Result   No acute cardiopulmonary process. Scheduled Medicines   Medications:    insulin glargine  30 Units SubCUTAneous Nightly    insulin lispro  10 Units SubCUTAneous TID WC    rosuvastatin  40 mg Oral Nightly    nicotine  1 patch TransDERmal Daily    sodium chloride flush  5-40 mL IntraVENous 2 times per day    enoxaparin  40 mg SubCUTAneous Daily    insulin lispro  0-8 Units SubCUTAneous TID WC    insulin lispro  0-8 Units SubCUTAneous 2 times per day      Infusions:    sodium chloride      sodium chloride 125 mL/hr at 01/24/23 2000         Objective:   Vitals: BP (!) 150/90   Pulse 58   Temp 98.2 °F (36.8 °C) (Oral)   Resp 18   Ht 6' (1.829 m)   Wt 188 lb 6.4 oz (85.5 kg)   SpO2 98%   BMI 25.55 kg/m²   General appearance: alert and cooperative with exam  Neck: no JVD or bruit  Thyroid : Normal lobes   Lungs: Has Vesicular Breath sounds   Heart:  regular rate and rhythm  Abdomen: soft, non-tender; bowel sounds normal; no masses,  no organomegaly  Musculoskeletal: Normal  Extremities: extremities normal, , no edema  Neurologic:  Awake, alert, oriented to name, place and time. Cranial nerves II-XII are grossly intact. Motor ? Arm weakness sensory is intact. ,  and gait is normal.    Assessment:     Patient Active Problem List:     DKA, type 1, not at goal [facility])     Hyperglycemia      Plan:     Reviewed POC blood glucose .  Labs and X ray results   Reviewed Current Medicines   On meal/ Correction bolus Humalog/ Basal Lantus Insulin regime /   Monitor Blood glucose frequently   Modified  the dose of Insulin/ other medicines as needed   Will follow     .      Reji Boateng MD, MD

## 2023-01-25 NOTE — DISCHARGE SUMMARY
Discharge Summary    Name:  Malika Qiu /Age/Sex: 1960  (63 y.o. male)   MRN & CSN:  [id number] & [id number] Admission Date/Time: 2023  5:17 AM   Attending:  Stef Martin MD Discharging Physician: Stef Martin MD       Admission Diagnosis:   Left arm twitching/spasm/jerking:  Left hand with decreased  strength and numbness  New onset diabetes mellitus with hyperglycemia  Rising Creatinine     Discharge Diagnosis:  Left arm twitching/spasm/jerking:  Left hand with decreased  strength and numbness  DKA - present on admission-  with New onset diabetes mellitus with hyperglycemia  HLD  Hyponatremia 2/2 likely pseudohyponatremia 2/2 translocational - Resolved   Hypophosphatemia   Elevated Cr  DDD      Discharge Exam  /83   Pulse 65   Temp 98.4 °F (36.9 °C)   Resp 16   Ht 6' (1.829 m)   Wt 194 lb 10.7 oz (88.3 kg)   SpO2 97%   BMI 26.40 kg/m²   Physical Exam  Vitals and nursing note reviewed. Constitutional:       General: He is not in acute distress. Appearance: He is overweight. He is not ill-appearing, toxic-appearing or diaphoretic. HENT:      Head: Normocephalic and atraumatic. Right Ear: External ear normal.      Left Ear: External ear normal.      Nose: Nose normal.      Mouth/Throat:      Mouth: Mucous membranes are moist.      Pharynx: Oropharynx is clear. Eyes:      General: No scleral icterus. Right eye: No discharge. Left eye: No discharge. Conjunctiva/sclera: Conjunctivae normal.      Pupils: Pupils are equal, round, and reactive to light. Cardiovascular:      Rate and Rhythm: Normal rate and regular rhythm. Pulses: Normal pulses. Heart sounds: Normal heart sounds. No murmur heard. No friction rub. No gallop. Pulmonary:      Effort: Pulmonary effort is normal. No respiratory distress. Breath sounds: Normal breath sounds. No stridor. No wheezing, rhonchi or rales.    Abdominal:      General: Bowel sounds are normal. There is no distension. Palpations: Abdomen is soft. There is no mass. Tenderness: There is no abdominal tenderness. There is no guarding or rebound. Hernia: No hernia is present. Musculoskeletal:      Right lower leg: No edema. Left lower leg: No edema. Comments: LUE 4+/5   Skin:     Capillary Refill: Capillary refill takes less than 2 seconds. Neurological:      Mental Status: He is alert. Hospital Course:   Kel Moulton is a 63 y.o.  male  who presents with DKA, type 1, not at goal [facility])    Patient admitted on 1/23/2023    Per H+P:   Kel Moulton is a 63 y.o. male with no significant past medical history pmh, last follow-up with PCP 2 years back who presents with left arm twitching and spasm for 1 day. Patient reports to be in usual state of health and started experiencing random left arm/hand spasms for 1 day. Patient is also noticed some left hand decreased  strength and numbness for the same duration. Patient denies any previous similar episodes, no chest pain or shortness of breath, no problem with vision/no headaches/no gait issues/no bowel/bladder/appetite or weight changes/no fever chills/sick contacts or new medications. Vital signs stable in the ED. Initial labs with sodium 125, chloride 90, BUN 34/creatinine 1.2, bicarb 18, anion gap 17, glucose 878, lipase 66, beta hydroxybutyrate >20, leukocytosis 11.9k, VBG with pH 7.34/PCO2 40, bicarb 21, CT head unremarkable, CT C-spine with degenerative changes, CTA with1. Severe narrowing at the right vertebral artery origin and mild narrowing at the left vertebral artery origin. 2. Fibrocalcific plaque is noted in bilateral carotid bulbs and proximal internal carotid arteries without evidence of a flow-limiting stenosis. 3. Unremarkable CTA of the brain. 4. Dental caries with periodontal disease.   Patient received 1 L normal saline bolus, insulin R 15 units in total, Ativan 0.5 mg.  Repeat BMP with sodium 133, bicarb improved to 25 from 18, creatinine improved to 1.1 from 1.2 and glucose improved to 590 from 878, anion gap resolved 10 from 17. Case was discussed with ICU attending and deemed not an ICU candidate/use for insulin drip at this time. Hospital course, assessment and plan:  Left arm twitching/spasm/jerking:  Left hand with decreased  strength and numbness:  -No clear previous history or inciting cause       - Likely all metabolic due to DM       -Neurochecks       -Telemetry        -CT head/CT C-spine/CTA as below         -Neurology on board - discussed with neurology - MRI brain w/o contrast - No signs of stroke        -TSH normal, A1c >16.5, ASCVD 32.3%        - EEG normal     DKA with New onset diabetes mellitus with hyperglycemia:  -Patient initial presentation concerning for DKA, however anion gap (17) /bicarb (18) /hyperglycemia (878 mg/dl) and Betahydroxybutyrate (>20.8) improved status post IV fluids. Case discussed with ICU attending and deemed stable for medicine for  -D/C IVF  - A1c >16.5  -Endocrine on board - F/U recs     HLD       - The 10-year ASCVD risk score (Geovayn SOOD, et al., 2019) is: 28.3%    Values used to calculate the score:      Age: 63 years      Sex: Male      Is Non- : No      Diabetic: Yes      Tobacco smoker: Yes      Systolic Blood Pressure: 342 mmHg      Is BP treated: No      HDL Cholesterol: 25 MG/DL      Total Cholesterol: 141 MG/DL     - Continue high intensity statin - rosuvastatin 40mg daily      Hyponatremia 2/2 likely pseudohyponatremia 2/2 translocational - Resolved      Hypophosphatemia       - PO4 2.4      - Replace      Elevated Cr  -Creatinine 1.2 on admission normal  -Improving status post IV fluids  -Monitor BMP and avoid nephrotoxic medications      Patient deemed stable enough to be discharged home.      The patient expressed appropriate understanding of and agreement with the discharge recommendations, medications, and plan. Consults this admission:  IP CONSULT TO Zach 2 TO ENDOCRINOLOGY      Discharge Instruction:   Handoff to PCP:     Follow up appointments: PCP within 1 week. With Endocrinology. With Neurology. Primary care physician: Discharge instructions as given to patient:   Be compliant with medications  Please take medications as prescribed   Please take insulin as prescribed   Please follow up with your primary care provider, with endocrinology and with neurology   Please ask your primary care doctor to refer you to outpatient physical therapy and occupational therapy   Please check your blood sugar three times a day before meals and nightly at bedtime. You should set up an appointment with a podiatrist (foot doctor) and with an ophthalmologist (eye doctor) as outpatient - you should see them at least yearly. Diet:  diabetic diet   Activity: activity as tolerated  Disposition: Discharged to:   [x]Home, [][facility], []SNF, []Acute Rehab, []Hospice   Condition on discharge: Stable    Discharge Medications:        Medication List        START taking these medications      blood glucose monitor kit and supplies  Dispense sufficient amount for indicated testing frequency plus additional to accommodate PRN testing needs. Dispense all needed supplies to include: monitor, strips, lancing device, lancets, control solutions, alcohol swabs.      insulin  UNIT/ML injection vial  Commonly known as: HumuLIN N  Inject 15 Units into the skin 2 times daily (before meals)     Lancets Misc  1 each by Does not apply route 3 times daily     rosuvastatin 40 MG tablet  Commonly known as: CRESTOR  Take 1 tablet by mouth nightly     senna 8.6 MG tablet  Commonly known as: Senokot  Take 1 tablet by mouth 2 times daily     tiZANidine 4 MG tablet  Commonly known as: ZANAFLEX  Take 1 tablet by mouth every 6 hours as needed (Muscle spasm)            STOP taking these medications      Aleve 220 MG tablet  Generic drug: naproxen sodium               Where to Get Your Medications        These medications were sent to [address] [facility] [address] [facility] [address] [address]      Phone: [phone]   blood glucose monitor kit and supplies  insulin  UNIT/ML injection vial  Lancets Misc  rosuvastatin 40 MG tablet  senna 8.6 MG tablet  tiZANidine 4 MG tablet         Objective Findings at Discharge:       BMP/CBC  Recent Labs     01/23/23  0532 01/23/23  1034 01/23/23  1616 01/24/23  0312 01/25/23  1215   *   < > 139 140 139   K 5.0   < > 4.3 4.4 4.4   CL 90*   < > 102 108 108   CO2 18*   < > 27 23 23   BUN 34*   < > 26* 24* 14   CREATININE 1.2   < > 1.2 1.0 1.0   WBC 11.9*  --   --  11.7* 9.2   HCT 43.9  --   --  36.5* 40.8*     --   --  206 233    < > = values in this interval not displayed. IMAGING:  MRI Brain w/o Contrast 1/24/2023   1. No acute intracranial abnormality. No acute infarction. 2. Mild microangiopathic change. CTA Head and Neck w/ Contrast 1/23/2023   1. Severe narrowing at the right vertebral artery origin and mild narrowing   at the left vertebral artery origin. 2. Fibrocalcific plaque is noted in bilateral carotid bulbs and proximal   internal carotid arteries without evidence of a flow-limiting stenosis. 3. Unremarkable CTA of the brain. 4. Dental caries with periodontal disease. CT C spine w/o contrast    No acute intracranial abnormality. Degenerative change in the cervical spine     Additional Information: Patient seen and examined day of discharge.  For more information regarding patient's care please contact [facility] [place] [address] records [phone]    Discharge Time of 45 minutes    Electronically signed by Magali Talavera MD on 1/25/2023 at 7:00 PM

## 2023-01-25 NOTE — CONSULTS
[id number] [facility], 1960, [id number], 1/25/2023    History  [place]:  The primary encounter diagnosis was Diabetes mellitus, new onset ([facility] [address] Diagnoses of Spasm of muscle, Left arm weakness, Hypoglycemia, Diabetic ketosis ([facility] [address] DKA, type 1, not at goal [facility]), and Diabetic ketoacidosis without coma associated with type 2 diabetes mellitus ([facility] [address] were also pertinent to this visit. Patient  has no past medical history on file. Patient  has no past surgical history on file. Subjective:  Patient states:  \"I'm ready to get home and get back to work\". Pain:  Pt report no pain. Communication with other providers:  Handoff to RN  Restrictions: low fall risk, general    Home Setup/Prior level of function  Social/Functional History  Lives With: Family  Type of Home: House  Home Layout: Two level  Home Access: Stairs to enter without rails  Entrance Stairs - Number of Steps: 1  Bathroom Shower/Tub: Tub/Shower unit  Bathroom Toilet: Standard  Bathroom Accessibility: Accessible  Has the patient had two or more falls in the past year or any fall with injury in the past year?: No  ADL Assistance: Independent  Homemaking Assistance: Independent  Homemaking Responsibilities: Yes  Ambulation Assistance: Independent  Transfer Assistance: Independent  Active : Yes  Occupation: Full time employment  Type of Occupation:     Examination of body systems (includes body structures/functions, activity/participation limitations):  Observation:  pt is awake in semi-fowlers upon arrival  Vision:  Glasses WFL  Hearing:  WFL  Cardiopulmonary:  WFL  Cognition: WFL, see OT/SLP note for further evaluation. Musculoskeletal  ROM R/L:  WFL BLE. Strength R/L:  Generally 4/5, fair in function and endurance.     Neuro:  mild LLE coordination deficit in gait  Gait pattern: Pt demonstrates step-through pattern with mild L sway in gait with intermittent LLE narrowed stride width    Mobility:  Supine to sit:  Ind  Transfers: SBA  Sitting balance:  SBA. Standing balance:  CGA. Gait: CGA    [facility] 6 Clicks Inpatient Mobility:  AM-PAC Inpatient Mobility Raw Score : 21    Treatment:  Bed mobility: Pt able to perform sup>sit with Ind, PT no cues or assist needed. Sitting balance: At EOB pt demonstrates ability to [facility] and scoot without UE support, SBA for safety only. Min cues for prevention of premature STS. Seated balance during assessment, pt donning of BOUBACAR sock in figure-4 position, x4 min SBA. STS: from EOB to upright no AD SBA. Standing balance: PT introduced RW for stability, mild sway initial stance without LOB. Pt able to complete SLS on R/L with light UE support x10 sec ea CGA no LOB. Gait: Pt AMB x120 ft with RW + 120 ft no AD with CGA, step-through pattern with mild L sway in gait with intermittent LLE narrowed stride width. Pt with x2 minor LOB due to LLE narrowed step width /narrowed ERIC- pt able to self-correct. Pt able to complete head-turns in gait with mild sway no LOB. RTS at EOB for rest with SBA. Rest x1 min. PT demonstrated and educated on use of SPC LUE. Final AMB bout from EOB> recliner x8 ft with SPC LUE, CGA. RTS at recliner with SBA and pt demonstrating fair- control. Education: Discussed pt PLOF, PT role, use of SPC for stability and d/c plan    Safety: patient left in recliner with alarm, call light within reach, RN notified, gait belt used. Assessment:    Pt is a 64 y/o male admitted 1/23 with c/o  DKA. Patient with significant h/o Diabetes mellitus, new onset ([address] Diagnoses of Spasm of muscle, Left arm weakness, Hypoglycemia, Diabetic ketosis ([address] DKA, type 1, not at goal [facility]), and Diabetic ketoacidosis without coma associated with type 2 diabetes mellitus , see chart. Per pt report pt has been performing ADLs/IADLs with IND, no AD, working full time.  At this time pt appears to be functioning near baseline. Pt is now presenting with impairments in LLE coordination, functional endurance, safety awareness, balance, gait. Pt would benefit from skilled PT services in order to address impairments and promote return to PLOF. PT to recommend d/c to Home with use of SPC, and OP PT. At this time pt without acute PT needs and will d/c from services.      Complexity: LOW    Equipment: Recommend SPC      Recommendations for NURSING mobility: AMB with SPC SBA    Time:   Time in: 17:32  Time out: 17:52  Timed treatment minutes: 10  Total time: 20    Electronically signed by:    Aracelis Meléndez, PT  1/34/5504, 1:54 PM  PT Lic #: [id number]

## 2023-01-25 NOTE — PROCEDURES
ROUTINE ELECTROENCEPHALOGRAM    Identifying Information:  Name: Irene Bruno  MRN: [id number]  : 1960  Interpreting Physician: Krystyna De DO  Referring Provider: Keerthi Shabazz DO  Date of EE23  Procedure Location: Inpatient     Clinical History:  Irene Bruno is a 63 y.o. male with concerns for seizure like activity. Current Medications:    sodium phosphate IVPB  10 mmol IntraVENous Once    insulin glargine  30 Units SubCUTAneous Nightly    insulin lispro  10 Units SubCUTAneous TID WC    rosuvastatin  40 mg Oral Nightly    nicotine  1 patch TransDERmal Daily    sodium chloride flush  5-40 mL IntraVENous 2 times per day    enoxaparin  40 mg SubCUTAneous Daily    insulin lispro  0-8 Units SubCUTAneous TID WC    insulin lispro  0-8 Units SubCUTAneous 2 times per day        Indication:  Rule out seizure/seizure disorder     Technical Summary:  28 channels of EEG were recorded in a digital format on a patient who was reported to be awake and drowsy during the recording. The patient was not sleep deprived prior to the EEG. The PDR consisted of well-developed, well-regulated 9-10 Hz alpha activity, maximal over the posterior head regions and reactive to eye opening and closure. Photic stimulation was performed and did not produce any abnormalities. During the recording stage II sleep was not seen. The EKG lead revealed no rhythm abnormalities. EEG Interpretation: This EEG was within normal limits for a patient of this age in the awake and drowsy states. No focal, lateralizing, or epileptiform features were seen during the recording. Clinical correlation is recommended.     Krystyna De DO  Epileptologist  2023 5:40 PM

## 2023-01-25 NOTE — PROGRESS NOTES
In-Patient Progress Note    Patient:  Codi Flores 63 y.o. male MRN: [id number]     Date of Service: 1/25/2023    Hospital Day: 3      Chief complaint: had concerns including Spasms (Left side, started Saturday.) and Muscle Pain (Left arm). Subjective   Patient seen and examined in the morning. Patient states he feels ok. States he has had no spasms in his hand. No cramping since last night either. Feels like his weakness has also improved. See ROS    I have reviewed all pertinent PMHx, PSHx, FamHx, SocialHx, medications, and allergies and updated history as appropriate. I have reviewed images as appropriate. Assessment and Plan   Codi Flores, a 63 y.o. male, with no known past medical history was admitted on 1/23/2023 with complaints of had concerns including Spasms (Left side, started Saturday.) and Muscle Pain (Left arm). Assessment and Plan:  Left arm twitching/spasm/jerking:  Left hand with decreased  strength and numbness:  -No clear previous history or inciting cause       - Likely all metabolic due to DM       -Neurochecks       -Telemetry        -CT head/CT C-spine/CTA as below         -Neurology on board - discussed with neurology - MRI brain w/o contrast - No signs of stroke        -TSH normal, A1c >16.5, ASCVD 32.3%        - EEG pending. DKA with New onset diabetes mellitus with hyperglycemia:  -Patient initial presentation concerning for DKA, however anion gap (17) /bicarb (18) /hyperglycemia (878 mg/dl) and Betahydroxybutyrate (>20.8) improved status post IV fluids.   Case discussed with ICU attending and deemed stable for medicine for  -D/C IVF  - A1c >16.5  -Endocrine on board - F/U recs    HLD       - The 10-year ASCVD risk score (Geovany SOOD, et al., 2019) is: 28.3%    Values used to calculate the score:      Age: 63 years      Sex: Male      Is Non- : No      Diabetic: Yes      Tobacco smoker: Yes      Systolic Blood Pressure: 834 mmHg Is BP treated: No      HDL Cholesterol: 25 MG/DL      Total Cholesterol: 141 MG/DL     - Continue high intensity statin - rosuvastatin 40mg daily     Hyponatremia 2/2 likely pseudohyponatremia 2/2 translocational - Resolved     Hypophosphatemia       - PO4 2.4      - Replace     DDD       - Degenerative changes of C spine on CT. Elevated Cr  -Creatinine 1.2 on admission normal  -Improving status post IV fluids  -Monitor BMP and avoid nephrotoxic medications      # Peptic ulcer prophylaxis: -   # DVT Prophylaxis: Enoxaparin   #CODE STATUS: Full      Current living situation: Home  Expected Disposition: Home  Estimated discharge date: Discussed with neuro - can be discharged today if EEG -ve. Review of System     Review of Systems   Constitutional:  Negative for appetite change, chills, fatigue, fever and unexpected weight change. HENT:  Negative for sinus pressure, sinus pain and sore throat. Eyes:  Negative for pain, redness and itching. Respiratory:  Negative for cough, chest tightness, shortness of breath and wheezing. Cardiovascular:  Negative for chest pain, palpitations and leg swelling. Gastrointestinal:  Negative for abdominal pain, constipation, diarrhea, nausea and vomiting. Endocrine: Negative for polydipsia, polyphagia and polyuria. Genitourinary:  Negative for decreased urine volume, difficulty urinating, dysuria, frequency, hematuria and urgency. Musculoskeletal:  Negative for arthralgias, back pain and myalgias. Skin:  Negative for pallor and rash. Neurological:  Positive for weakness (Lt. arm - Improving). Negative for dizziness, tremors, seizures, syncope, light-headedness, numbness and headaches. Psychiatric/Behavioral:  Negative for agitation and confusion. I have reviewed all pertinent PMHx, PSHx, FamHx, SocialHx, medications, and allergies and updated history as appropriate.     Physical Exam   VITAL SIGNS:  /83   Pulse 65   Temp 98.4 °F (36.9 °C) Resp 16   Ht 6' (1.829 m)   Wt 194 lb 10.7 oz (88.3 kg)   SpO2 97%   BMI 26.40 kg/m²   Tmax over 24 hours:  Temp (24hrs), Av.1 °F (36.7 °C), Min:97.7 °F (36.5 °C), Max:98.4 °F (36.9 °C)      Patient Vitals for the past 6 hrs:   BP Temp Pulse Resp SpO2   23 1505 126/83 98.4 °F (36.9 °C) 65 16 97 %   23 1122 -- -- -- -- 96 %       No intake or output data in the 24 hours ending 23 1510    Wt Readings from Last 2 Encounters:   23 194 lb 10.7 oz (88.3 kg)   04/15/19 200 lb (90.7 kg)     Body mass index is 26.4 kg/m². Physical Exam  Vitals and nursing note reviewed. Constitutional:       General: He is not in acute distress. Appearance: He is overweight. He is not ill-appearing, toxic-appearing or diaphoretic. HENT:      Head: Normocephalic and atraumatic. Right Ear: External ear normal.      Left Ear: External ear normal.      Nose: Nose normal.      Mouth/Throat:      Mouth: Mucous membranes are moist.      Pharynx: Oropharynx is clear. Eyes:      General: No scleral icterus. Right eye: No discharge. Left eye: No discharge. Conjunctiva/sclera: Conjunctivae normal.      Pupils: Pupils are equal, round, and reactive to light. Cardiovascular:      Rate and Rhythm: Normal rate and regular rhythm. Pulses: Normal pulses. Heart sounds: Normal heart sounds. No murmur heard. No friction rub. No gallop. Pulmonary:      Effort: Pulmonary effort is normal. No respiratory distress. Breath sounds: Normal breath sounds. No stridor. No wheezing, rhonchi or rales. Abdominal:      General: Bowel sounds are normal. There is no distension. Palpations: Abdomen is soft. There is no mass. Tenderness: There is no abdominal tenderness. There is no guarding or rebound. Hernia: No hernia is present. Musculoskeletal:      Right lower leg: No edema. Left lower leg: No edema.       Comments: LUE 4+/5   Skin:     Capillary Refill: Capillary refill takes less than 2 seconds. Neurological:      Mental Status: He is alert. Current Medications      sodium phosphate IVPB  10 mmol IntraVENous Once    insulin glargine  30 Units SubCUTAneous Nightly    insulin lispro  10 Units SubCUTAneous TID     rosuvastatin  40 mg Oral Nightly    nicotine  1 patch TransDERmal Daily    sodium chloride flush  5-40 mL IntraVENous 2 times per day    enoxaparin  40 mg SubCUTAneous Daily    insulin lispro  0-8 Units SubCUTAneous TID     insulin lispro  0-8 Units SubCUTAneous 2 times per day         Labs and Imaging Studies   Laboratory findings:  CT HEAD WO CONTRAST    Result Date: 1/23/2023  EXAMINATION: CT OF THE HEAD WITHOUT CONTRAST; CT OF THE CERVICAL SPINE WITHOUT CONTRAST 1/23/2023 6:04 am TECHNIQUE: CT of the head was performed without the administration of intravenous contrast. Automated exposure control, iterative reconstruction, and/or weight based adjustment of the mA/kV was utilized to reduce the radiation dose to as low as reasonably achievable.; CT of the cervical spine was performed without the administration of intravenous contrast. Multiplanar reformatted images are provided for review. Automated exposure control, iterative reconstruction, and/or weight based adjustment of the mA/kV was utilized to reduce the radiation dose to as low as reasonably achievable. COMPARISON: None. HISTORY: ORDERING SYSTEM PROVIDED HISTORY: left arm spasm TECHNOLOGIST PROVIDED HISTORY: Reason for exam:->left arm spasm Has a \"code stroke\" or \"stroke alert\" been called? ->No Decision Support Exception - unselect if not a suspected or confirmed emergency medical condition->Emergency Medical Condition (MA) Reason for Exam: left arm spasm FINDINGS: BRAIN/VENTRICLES: There is no acute intracranial hemorrhage, mass effect or midline shift. No abnormal extra-axial fluid collection. The gray-white differentiation is maintained without evidence of an acute infarct. There is no evidence of hydrocephalus. ORBITS: The visualized portion of the orbits demonstrate no acute abnormality. SINUSES: The visualized paranasal sinuses and mastoid air cells demonstrate no acute abnormality. SOFT TISSUES/SKULL:  No acute abnormality of the visualized skull or soft tissues. CT cervical spine: There is degenerative change in the cervical spine at multiple levels with decreased disc space height, osteophytosis, and posterior spondylitic ridging. This is most pronounced at C4-5, C5-6, and C6-7. No acute fracture or dislocation is seen. No acute intracranial abnormality. Degenerative change in the cervical spine     CT CERVICAL SPINE WO CONTRAST    Result Date: 1/23/2023  EXAMINATION: CT OF THE HEAD WITHOUT CONTRAST; CT OF THE CERVICAL SPINE WITHOUT CONTRAST 1/23/2023 6:04 am TECHNIQUE: CT of the head was performed without the administration of intravenous contrast. Automated exposure control, iterative reconstruction, and/or weight based adjustment of the mA/kV was utilized to reduce the radiation dose to as low as reasonably achievable.; CT of the cervical spine was performed without the administration of intravenous contrast. Multiplanar reformatted images are provided for review. Automated exposure control, iterative reconstruction, and/or weight based adjustment of the mA/kV was utilized to reduce the radiation dose to as low as reasonably achievable. COMPARISON: None. HISTORY: ORDERING SYSTEM PROVIDED HISTORY: left arm spasm TECHNOLOGIST PROVIDED HISTORY: Reason for exam:->left arm spasm Has a \"code stroke\" or \"stroke alert\" been called? ->No Decision Support Exception - unselect if not a suspected or confirmed emergency medical condition->Emergency Medical Condition (MA) Reason for Exam: left arm spasm FINDINGS: BRAIN/VENTRICLES: There is no acute intracranial hemorrhage, mass effect or midline shift. No abnormal extra-axial fluid collection.   The gray-white differentiation is maintained without evidence of an acute infarct. There is no evidence of hydrocephalus. ORBITS: The visualized portion of the orbits demonstrate no acute abnormality. SINUSES: The visualized paranasal sinuses and mastoid air cells demonstrate no acute abnormality. SOFT TISSUES/SKULL:  No acute abnormality of the visualized skull or soft tissues. CT cervical spine: There is degenerative change in the cervical spine at multiple levels with decreased disc space height, osteophytosis, and posterior spondylitic ridging. This is most pronounced at C4-5, C5-6, and C6-7. No acute fracture or dislocation is seen. No acute intracranial abnormality. Degenerative change in the cervical spine     XR CHEST PORTABLE    Result Date: 1/23/2023  EXAMINATION: ONE XRAY VIEW OF THE CHEST 1/23/2023 5:56 am COMPARISON: None. HISTORY: ORDERING SYSTEM PROVIDED HISTORY: left arm pain TECHNOLOGIST PROVIDED HISTORY: Reason for exam:->left arm pain Reason for Exam: Spasms; Muscle Pain Additional signs and symptoms: left arm pain, spasms FINDINGS: There is no consolidation, pleural effusion, or pneumothorax. Calcified granulomas at the left apex and in both lower lungs. Cardiac silhouette is not enlarged and there is no pulmonary vascular congestion. Mediastinum and jersey are within normal limits. Bony thorax is unremarkable. No acute cardiopulmonary process. CTA HEAD NECK W CONTRAST    Result Date: 1/23/2023  EXAMINATION: CTA OF THE HEAD AND NECK WITH CONTRAST 1/23/2023 10:46 am: TECHNIQUE: CTA of the head and neck was performed with the administration of intravenous contrast. Multiplanar reformatted images are provided for review. MIP images are provided for review. Stenosis of the internal carotid arteries measured using NASCET criteria. Automated exposure control, iterative reconstruction, and/or weight based adjustment of the mA/kV was utilized to reduce the radiation dose to as low as reasonably achievable.  COMPARISON: Head CT on 01/23/2023. HISTORY: ORDERING SYSTEM PROVIDED HISTORY: left arm weakness TECHNOLOGIST PROVIDED HISTORY: Reason for exam:->left arm weakness Has a \"code stroke\" or \"stroke alert\" been called? ->No Decision Support Exception - unselect if not a suspected or confirmed emergency medical condition->Emergency Medical Condition (MA) Reason for Exam: left arm weakness Relevant Medical/Surgical History: [id number] FINDINGS: CTA NECK: AORTIC ARCH/ARCH VESSELS: Vascular calcifications are noted along the aortic arch. There is a normal branch pattern of the aortic arch. The innominate and subclavian arteries are patent without evidence of a flow-limiting stenosis. CAROTID ARTERIES: The right common carotid artery is normal in caliber. Fibrocalcific plaque is noted in the right carotid bulb and proximal right internal carotid artery. No flow-limiting stenosis or dissection. There is mild fibrofatty plaque in the left common carotid artery with areas of minimal narrowing. Fibrocalcific plaque is noted in the proximal left internal carotid artery without evidence of a flow-limiting stenosis. VERTEBRAL ARTERIES: There is severe narrowing at the right vertebral artery origin. Mild narrowing at the left vertebral artery origin. No dissection. SOFT TISSUES: There are calcified granulomas in the lungs. No superior mediastinal adenopathy. The thyroid gland is normal in appearance. Submandibular and parotid glands are unremarkable. The parapharyngeal fat spaces are preserved. No cervical or supraclavicular adenopathy. BONES: There are dental caries with periodontal disease. Degenerative changes are noted in the spine. No fracture. CTA HEAD: ANTERIOR CIRCULATION: Vascular calcifications are noted in the supraclinoid ICAs. The left A1 segment is hypoplastic. There is an azygous A2 segment. The middle cerebral arteries are unremarkable. POSTERIOR CIRCULATION: The P-comms are patent. The basilar artery is patent. There is a complete fetal origin of the left posterior cerebral artery. There is a near complete fetal origin of the right posterior cerebral artery. OTHER: No dural venous sinus thrombosis on this non-dedicated study. BRAIN: No areas of abnormal enhancement. No midline shift. 1. Severe narrowing at the right vertebral artery origin and mild narrowing at the left vertebral artery origin. 2. Fibrocalcific plaque is noted in bilateral carotid bulbs and proximal internal carotid arteries without evidence of a flow-limiting stenosis. 3. Unremarkable CTA of the brain. 4. Dental caries with periodontal disease.        Recent Results (from the past 24 hour(s))   POCT Glucose    Collection Time: 01/24/23  4:22 PM   Result Value Ref Range    POC Glucose 87 70 - 99 MG/DL   POCT Glucose    Collection Time: 01/24/23  7:44 PM   Result Value Ref Range    POC Glucose 273 (H) 70 - 99 MG/DL   POCT Glucose    Collection Time: 01/25/23  2:39 AM   Result Value Ref Range    POC Glucose 191 (H) 70 - 99 MG/DL   POCT Glucose    Collection Time: 01/25/23  7:46 AM   Result Value Ref Range    POC Glucose 194 (H) 70 - 99 MG/DL   POCT Glucose    Collection Time: 01/25/23 11:58 AM   Result Value Ref Range    POC Glucose 169 (H) 70 - 99 MG/DL   CBC with Auto Differential    Collection Time: 01/25/23 12:15 PM   Result Value Ref Range    WBC 9.2 4.0 - 10.5 K/CU MM    RBC 4.12 (L) 4.6 - 6.2 M/CU MM    Hemoglobin 13.3 (L) 13.5 - 18.0 GM/DL    Hematocrit 40.8 (L) 42 - 52 %    MCV 99.0 78 - 100 FL    MCH 32.3 (H) 27 - 31 PG    MCHC 32.6 32.0 - 36.0 %    RDW 14.0 11.7 - 14.9 %    Platelets 902 605 - 536 K/CU MM    MPV 12.3 (H) 7.5 - 11.1 FL    Differential Type AUTOMATED DIFFERENTIAL     Segs Relative 60.3 36 - 66 %    Lymphocytes % 26.6 24 - 44 %    Monocytes % 9.7 (H) 0 - 4 %    Eosinophils % 1.5 0 - 3 %    Basophils % 1.2 (H) 0 - 1 %    Segs Absolute 5.5 K/CU MM    Lymphocytes Absolute 2.4 K/CU MM    Monocytes Absolute 0.9 K/CU MM    Eosinophils Absolute 0.1 K/CU MM    Basophils Absolute 0.1 K/CU MM    Nucleated RBC % 0.0 %    Total Nucleated RBC 0.0 K/CU MM    Total Immature Neutrophil 0.06 K/CU MM    Immature Neutrophil % 0.7 (H) 0 - 0.43 %   Basic Metabolic Panel    Collection Time: 01/25/23 12:15 PM   Result Value Ref Range    Sodium 139 135 - 145 MMOL/L    Potassium 4.4 3.5 - 5.1 MMOL/L    Chloride 108 99 - 110 mMol/L    CO2 23 21 - 32 MMOL/L    Anion Gap 8 4 - 16    BUN 14 6 - 23 MG/DL    Creatinine 1.0 0.9 - 1.3 MG/DL    Est, Glom Filt Rate >60 >60 mL/min/1.73m2    Glucose 169 (H) 70 - 99 MG/DL    Calcium 8.9 8.3 - 10.6 MG/DL   Magnesium    Collection Time: 01/25/23 12:15 PM   Result Value Ref Range    Magnesium 1.8 1.8 - 2.4 mg/dl   Phosphorus    Collection Time: 01/25/23 12:15 PM   Result Value Ref Range    Phosphorus 2.4 (L) 2.5 - 4.9 MG/DL           Electronically signed by Pollo Masters MD on 1/25/2023 at 3:10 PM

## 2023-01-25 NOTE — DISCHARGE INSTRUCTIONS
Internal Medicine Discharge Instruction    Discharge to:  Home  Diet: Diabetic  Activity: As tolerated       Be compliant with medications  Please take medications as prescribed   Please take insulin as prescribed   Please follow up with your primary care provider, with endocrinology and with neurology   Please ask your primary care doctor to refer you to outpatient physical therapy and occupational therapy   Please check your blood sugar three times a day before meals and nightly at bedtime. You should set up an appointment with a podiatrist (foot doctor) and with an ophthalmologist (eye doctor) as outpatient - you should see them at least yearly.          Electronically signed by Darcy Alston MD on 1/25/2023 at 11:57 AM

## 2023-01-25 NOTE — PROGRESS NOTES
Neurology Service Progress Note  [facility] DR. YANG MORALES   Patient Name: Steven Eckert  : 1960        Subjective:   CC: Left arm spasm, jerking, weakness and numbness for 1 day  Chart was reviewed in detail. Patient was seen and assessed. He is resting in bed this morning, looks well and is feeling better. He has not had any episodes of the spasms/jerking in the left upper extremity today. Discussed MRI results, patient understands that there is no evidence for acute stroke. Discussed that EEG would be completed to evaluate for any abnormal brainwave function that may make him more prone to seizure. Given symptoms and no evidence of stroke, focal seizure cannot be excluded as a possible cause. Patient also understands that study is being completed here in the hospital however he may also need to follow-up in our office for an ambulatory EEG in the future if symptoms persist.    History reviewed. No pertinent past medical history. :   History reviewed. No pertinent surgical history.   Medications:  Scheduled Meds:   sodium phosphate IVPB  10 mmol IntraVENous Once    insulin glargine  30 Units SubCUTAneous Nightly    insulin lispro  10 Units SubCUTAneous TID WC    rosuvastatin  40 mg Oral Nightly    nicotine  1 patch TransDERmal Daily    sodium chloride flush  5-40 mL IntraVENous 2 times per day    enoxaparin  40 mg SubCUTAneous Daily    insulin lispro  0-8 Units SubCUTAneous TID WC    insulin lispro  0-8 Units SubCUTAneous 2 times per day     Continuous Infusions:   sodium chloride       PRN Meds:.sodium chloride flush, sodium chloride, acetaminophen **OR** acetaminophen, potassium chloride **OR** potassium alternative oral replacement **OR** potassium chloride, magnesium sulfate, sennosides, tiZANidine    No Known Allergies  Social History     Socioeconomic History    Marital status:      Spouse name: Not on file    Number of children: Not on file    Years of education: Not on file Highest education level: Not on file   Occupational History    Not on file   Tobacco Use    Smoking status: Every Day     Packs/day: 0.50     Types: Cigarettes    Smokeless tobacco: Never   Substance and Sexual Activity    Alcohol use: Not Currently    Drug use: Never    Sexual activity: Not on file   Other Topics Concern    Not on file   Social History Narrative    Not on file     Social Determinants of Health     Financial Resource Strain: Not on file   Food Insecurity: Not on file   Transportation Needs: Not on file   Physical Activity: Not on file   Stress: Not on file   Social Connections: Not on file   Intimate Partner Violence: Not on file   Housing Stability: Not on file      History reviewed. No pertinent family history.       ROS (10 systems)  In addition to that documented in the HPI above, the additional ROS was obtained:  Constitutional: Denies fevers or chills  Eyes: Denies vision changes  ENMT: Denies sore throat  CV: Denies chest pain  Resp: Denies SOB  GI: Denies vomiting or diarrhea  : Denies painful urination  MSK: Denies recent trauma  Skin: Denies new rashes  Neuro: Cramping, spasms and loss of dexterity in the left upper extremity  Endocrine: Denies unexpected weight loss  Heme: Denies bleeding disorders    Physical Exam:          Wt Readings from Last 3 Encounters:   01/25/23 194 lb 10.7 oz (88.3 kg)   04/15/19 200 lb (90.7 kg)     Temp Readings from Last 3 Encounters:   01/25/23 98.4 °F (36.9 °C)   04/15/19 97.9 °F (36.6 °C) (Oral)     BP Readings from Last 3 Encounters:   01/25/23 126/83   04/15/19 (!) 140/81     Pulse Readings from Last 3 Encounters:   01/25/23 65   04/15/19 82        Gen: A&O x 4, NAD, cooperative  HEENT: NC/AT, EOMI, PERRL, mmm,  neck supple, no meningeal signs; Fundoscopic: no disc edema appreciated  Heart: NSR/SB  Lungs: Respirations even unlabored  Ext: no edema, no calf tenderness b/l  Psych: normal mood and affect  Skin: no rashes or lesions    NEUROLOGIC EXAM:    Mental Status: A&O to self, location, month and year, NAD, speech clear, language fluent, repetition and naming intact, follows commands appropriately    Cranial Nerve Exam:   CN II-XII:  PERRL, VFF, no nystagmus, no gaze paresis, sensation V1-V3 intact b/l, muscles of facial expression symmetric; hearing intact to conversational tone, palate elevates symmetrically, shoulder elevation symmetric and tongue protrudes midline with movement side to side. Motor Exam:       Strength 5/5 UE right, 4/5 UE left with decreased  strength in the left hand 5/5 LE's b/l  Tone and bulk normal   No pronator drift    Deep Tendon Reflexes: 1 2/4 biceps, triceps, brachioradialis, patellar, and achilles b/l; flexor plantar responses b/l    Sensation: decreased to temperature and vibration in the left upper extremity otherwise sensation is intact. Coordination/Cerebellum:       Tremors--none      Rapidly alternating movements:  dysdiadochokinesia left               Heel-to-Shin: no dysmetria b/l      Finger-to-Nose: no dysmetria b/l    Gait and stance:      Gait: deferred      LABS:     Recent Labs     01/23/23  0532 01/23/23  1034 01/23/23  1616 01/24/23  0312 01/25/23  1215   WBC 11.9*  --   --  11.7* 9.2   *   < > 139 140 139   K 5.0   < > 4.3 4.4 4.4   CL 90*   < > 102 108 108   CO2 18*   < > 27 23 23   BUN 34*   < > 26* 24* 14   CREATININE 1.2   < > 1.2 1.0 1.0   GLUCOSE 878*   < > 369* 164* 169*    < > = values in this interval not displayed. IMAGING:    CT head and cervical spine w/o contrast:  Impression   No acute intracranial abnormality. Degenerative change in the cervical spine         CTA head and neck:  Impression   1. Severe narrowing at the right vertebral artery origin and mild narrowing   at the left vertebral artery origin. 2. Fibrocalcific plaque is noted in bilateral carotid bulbs and proximal   internal carotid arteries without evidence of a flow-limiting stenosis.    3. Unremarkable CTA of the brain. 4. Dental caries with periodontal disease. MRI brain w/o contrast:  Impression   1. No acute intracranial abnormality. No acute infarction. 2. Mild microangiopathic change. All imaging was personally reviewed     ASSESSMENT/PLAN:   82-year-old male presenting with left arm spasm, jerking and weakness with sensory change. Symptoms of been ongoing for several years but progressed significantly over the last day. Patient is lying in bed awake, alert and oriented x4. He states that he is feeling much better and has not had any episodes of spasm of the hand or jerking of the left upper extremity so far today. He denies any headache, vision change, new weakness or sensory change. left upper extremity weakness, spasm, jerking concerning for acute stroke superimposed on hyperglycemia, hypertension  Neuroimaging as above  MRI brain as above nonacute  CTA head and neck notes severe right vertebral artery stenosis, mild left vertebral artery stenosis, plaques within bilateral carotid bulbs without evidence of flow-limiting stenosis  Routine EEG has been ordered to assess for possible focal seizures-pending final read  Initial glucose 878, now improved in the 150s-160s management per IM  A1c  >16.5  TSH WNL  Continue rosuvastatin  LDL 62  Will initiate antiplaelet pending MRI results  Recommend SBP <140  PT/OT as recommended  Further recommendations pending EEG results    Patient was discussed with attending neurologist Dr. Marli Mortensen      Thank you for allowing us to participate in the care of your patient. If there are any questions regarding evaluation please feel free to contact us. Yaakov Councilman, APRN - CNP, 1/25/2023       ------------------------------------    Attending Note:  I have rounded on this patient with Lilly Osullivan CNP. I have reviewed the chart and we have discussed this case in detail. The patient was seen and examined by myself.  Pertinent labs and imaging have been personally reviewed. Our findings and impressions were discussed with the patient. I concur with the Nurse Practioner's assessment and plan. Was having jerking and spasming of the left upper extremity. This seems to have resolved. Still display some dysdiadochokinesia on the left upper extremity. Could certainly be related to hypoglycemia. An MRI of the brain is pending at this time to rule out stroke. Focal seizures in the differential diagnosis as well. We will order an EEG.     Mercedes Valentineole, APRN - CNP 1/25/2023 5:08 PM

## 2023-01-26 VITALS
TEMPERATURE: 97.8 F | OXYGEN SATURATION: 97 % | DIASTOLIC BLOOD PRESSURE: 82 MMHG | HEART RATE: 77 BPM | HEIGHT: 72 IN | BODY MASS INDEX: 26.37 KG/M2 | SYSTOLIC BLOOD PRESSURE: 134 MMHG | RESPIRATION RATE: 16 BRPM | WEIGHT: 194.67 LBS

## 2023-01-26 LAB
GLUCOSE BLD-MCNC: 112 MG/DL (ref 70–99)
GLUCOSE BLD-MCNC: 120 MG/DL (ref 70–99)

## 2023-01-26 PROCEDURE — 82962 GLUCOSE BLOOD TEST: CPT

## 2023-01-26 PROCEDURE — 94761 N-INVAS EAR/PLS OXIMETRY MLT: CPT

## 2023-01-26 PROCEDURE — 99233 SBSQ HOSP IP/OBS HIGH 50: CPT | Performed by: NURSE PRACTITIONER

## 2023-01-26 NOTE — PROGRESS NOTES
Progress Note( Dr. Myrna Celeste)  1/25/2023  Subjective:   Admit Date: 1/23/2023  PCP: AMADO Patterson CNP    Admitted For : Left arm twitching and spasms patient was hyperglycemic with possible DKA    Consulted For: Better control of blood glucose    Interval History: Feels much better this morning  Blood glucoses a lot better  Patient was seen by neurology for possible CVA  Reviewed notes from neurology  As normal CT scan of brain and also normal MRI brain    Denies any chest pains,   Denies SOB . Denies nausea or vomiting. No new bowel or bladder symptoms. No intake or output data in the 24 hours ending 01/25/23 1918      DATA    CBC:   Recent Labs     01/23/23  0532 01/24/23  0312 01/25/23  1215   WBC 11.9* 11.7* 9.2   HGB 15.3 12.5* 13.3*    206 233      CMP:  Recent Labs     01/23/23  0532 01/23/23  1034 01/23/23  1616 01/24/23  0312 01/25/23  1215   *   < > 139 140 139   K 5.0   < > 4.3 4.4 4.4   CL 90*   < > 102 108 108   CO2 18*   < > 27 23 23   BUN 34*   < > 26* 24* 14   CREATININE 1.2   < > 1.2 1.0 1.0   CALCIUM 9.3   < > 9.7 8.7 8.9   PROT 6.7  --   --  5.2*  --    LABALBU 4.1  --   --  3.2*  --    BILITOT 0.1  --   --  0.2  --    ALKPHOS 114  --   --  63  --    AST 7*  --   --  10*  --    ALT 7*  --   --  9*  --     < > = values in this interval not displayed. Lipids:   Lab Results   Component Value Date/Time    CHOL 141 01/24/2023 03:12 AM    HDL 25 01/24/2023 03:12 AM    TRIG 268 01/24/2023 03:12 AM     Glucose:  Recent Labs     01/25/23  0746 01/25/23  1158 01/25/23  1746   POCGLU 194* 169* 82       [id number]:  Lab Results   Component Value Date/Time    LABA1C >16.5 01/23/2023 05:32 AM     High Sensitivity TSH:   Lab Results   Component Value Date/Time    TSHHS 1.520 01/23/2023 10:34 AM     Free T3: No results found for: FT3  Free T4:No results found for: T4FREE    MRI BRAIN WO CONTRAST   Final Result   1. No acute intracranial abnormality. No acute infarction.    2. Mild microangiopathic change. RECOMMENDATIONS:   Unavailable         CTA HEAD NECK W CONTRAST   Final Result   1. Severe narrowing at the right vertebral artery origin and mild narrowing   at the left vertebral artery origin. 2. Fibrocalcific plaque is noted in bilateral carotid bulbs and proximal   internal carotid arteries without evidence of a flow-limiting stenosis. 3. Unremarkable CTA of the brain. 4. Dental caries with periodontal disease. CT CERVICAL SPINE WO CONTRAST   Final Result   No acute intracranial abnormality. Degenerative change in the cervical spine         CT HEAD WO CONTRAST   Final Result   No acute intracranial abnormality. Degenerative change in the cervical spine         XR CHEST PORTABLE   Final Result   No acute cardiopulmonary process. Scheduled Medicines   Medications:    sodium phosphate IVPB  10 mmol IntraVENous Once    insulin glargine  30 Units SubCUTAneous Nightly    insulin lispro  10 Units SubCUTAneous TID WC    rosuvastatin  40 mg Oral Nightly    nicotine  1 patch TransDERmal Daily    sodium chloride flush  5-40 mL IntraVENous 2 times per day    enoxaparin  40 mg SubCUTAneous Daily    insulin lispro  0-8 Units SubCUTAneous TID WC    insulin lispro  0-8 Units SubCUTAneous 2 times per day      Infusions:    sodium chloride           Objective:   Vitals: /83   Pulse 65   Temp 98.4 °F (36.9 °C)   Resp 16   Ht 6' (1.829 m)   Wt 194 lb 10.7 oz (88.3 kg)   SpO2 97%   BMI 26.40 kg/m²   General appearance: alert and cooperative with exam  Neck: no JVD or bruit  Thyroid : Normal lobes   Lungs: Has Vesicular Breath sounds   Heart:  regular rate and rhythm  Abdomen: soft, non-tender; bowel sounds normal; no masses,  no organomegaly  Musculoskeletal: Normal  Extremities: extremities normal, , no edema  Neurologic:  Awake, alert, oriented to name, place and time. Cranial nerves II-XII are grossly intact. Motor ?   Arm weakness sensory is intact. ,  and gait is normal.    Assessment:     Patient Active Problem List:     DKA, type 1, not at goal [facility])     Hyperglycemia         Weakness of left side of the upper lower extremity      Plan:     Reviewed POC blood glucose . Labs and X ray results   Reviewed Current Medicines   On meal/ Correction bolus Humalog/ Basal Lantus Insulin regime /   Monitor Blood glucose frequently   Modified  the dose of Insulin/ other medicines as needed   Will follow     .      Lukas Ramos MD, MD

## 2023-01-26 NOTE — PROGRESS NOTES
Neurology Service Progress Note  [facility] DR. YANG MORALES   Patient Name: Lissa Zhao  : 1960        Subjective:   CC: Left arm spasm, jerking, weakness and numbness for 1 day  Chart was reviewed in detail. Patient was seen and assessed. He is resting in bed this morning, looks well and is feeling better. He has not had any episodes of the spasms/jerking in the left upper extremity today. Discussed MRI results, patient understands that there is no evidence for acute stroke. Discussed that EEG would be completed to evaluate for any abnormal brainwave function that may make him more prone to seizure. Given symptoms and no evidence of stroke, focal seizure cannot be excluded as a possible cause. Patient also understands that study is being completed here in the hospital however he may also need to follow-up in our office for an ambulatory EEG in the future if symptoms persist.    History reviewed. No pertinent past medical history. :   History reviewed. No pertinent surgical history.   Medications:  Scheduled Meds:   insulin glargine  30 Units SubCUTAneous Nightly    insulin lispro  10 Units SubCUTAneous TID WC    rosuvastatin  40 mg Oral Nightly    nicotine  1 patch TransDERmal Daily    sodium chloride flush  5-40 mL IntraVENous 2 times per day    enoxaparin  40 mg SubCUTAneous Daily    insulin lispro  0-8 Units SubCUTAneous TID WC    insulin lispro  0-8 Units SubCUTAneous 2 times per day     Continuous Infusions:   sodium chloride       PRN Meds:.sodium chloride flush, sodium chloride, acetaminophen **OR** acetaminophen, potassium chloride **OR** potassium alternative oral replacement **OR** potassium chloride, magnesium sulfate, sennosides, tiZANidine    No Known Allergies  Social History     Socioeconomic History    Marital status:      Spouse name: Not on file    Number of children: Not on file    Years of education: Not on file    Highest education level: Not on file   Occupational History    Not on file   Tobacco Use    Smoking status: Every Day     Packs/day: 0.50     Types: Cigarettes    Smokeless tobacco: Never   Substance and Sexual Activity    Alcohol use: Not Currently    Drug use: Never    Sexual activity: Not on file   Other Topics Concern    Not on file   Social History Narrative    Not on file     Social Determinants of Health     Financial Resource Strain: Not on file   Food Insecurity: Not on file   Transportation Needs: Not on file   Physical Activity: Not on file   Stress: Not on file   Social Connections: Not on file   Intimate Partner Violence: Not on file   Housing Stability: Not on file      History reviewed. No pertinent family history.      ROS (10 systems)  In addition to that documented in the HPI above, the additional ROS was obtained:  Constitutional: Denies fevers or chills  Eyes: Denies vision changes  ENMT: Denies sore throat  CV: Denies chest pain  Resp: Denies SOB  GI: Denies vomiting or diarrhea  : Denies painful urination  MSK: Denies recent trauma  Skin: Denies new rashes  Neuro: Cramping, spasms and loss of dexterity in the left upper extremity  Endocrine: Denies unexpected weight loss  Heme: Denies bleeding disorders    Physical Exam:          Wt Readings from Last 3 Encounters:   01/25/23 194 lb 10.7 oz (88.3 kg)   04/15/19 200 lb (90.7 kg)     Temp Readings from Last 3 Encounters:   01/26/23 97.7 °F (36.5 °C) (Oral)   04/15/19 97.9 °F (36.6 °C) (Oral)     BP Readings from Last 3 Encounters:   01/26/23 128/84   04/15/19 (!) 140/81     Pulse Readings from Last 3 Encounters:   01/26/23 57   04/15/19 82        Gen: A&O x 4, NAD, cooperative  HEENT: NC/AT, EOMI, PERRL, mmm,  neck supple, no meningeal signs; Fundoscopic: no disc edema appreciated  Heart: NSR/SB  Lungs: Respirations even unlabored  Ext: no edema, no calf tenderness b/l  Psych: normal mood and affect  Skin: no rashes or lesions    NEUROLOGIC EXAM:    Mental Status: A&O to self,  location, month and year, NAD, speech clear, language fluent, repetition and naming intact, follows commands appropriately    Cranial Nerve Exam:   CN II-XII:  PERRL, VFF, no nystagmus, no gaze paresis, sensation V1-V3 intact b/l, muscles of facial expression symmetric; hearing intact to conversational tone, palate elevates symmetrically, shoulder elevation symmetric and tongue protrudes midline with movement side to side. Motor Exam:       Strength 5/5 UE right, 4/5 UE left with decreased  strength in the left hand 5/5 LE's b/l  Tone and bulk normal   No pronator drift    Deep Tendon Reflexes: 1 2/4 biceps, triceps, brachioradialis, patellar, and achilles b/l; flexor plantar responses b/l    Sensation: decreased to temperature and vibration in the left upper extremity otherwise sensation is intact. Coordination/Cerebellum:       Tremors--none      Rapidly alternating movements:  dysdiadochokinesia left               Heel-to-Shin: no dysmetria b/l      Finger-to-Nose: no dysmetria b/l    Gait and stance:      Gait: deferred      LABS:     Recent Labs     01/23/23  1616 01/24/23  0312 01/25/23  1215   WBC  --  11.7* 9.2    140 139   K 4.3 4.4 4.4    108 108   CO2 27 23 23   BUN 26* 24* 14   CREATININE 1.2 1.0 1.0   GLUCOSE 369* 164* 169*           IMAGING:    CT head and cervical spine w/o contrast:  Impression   No acute intracranial abnormality. Degenerative change in the cervical spine         CTA head and neck:  Impression   1. Severe narrowing at the right vertebral artery origin and mild narrowing   at the left vertebral artery origin. 2. Fibrocalcific plaque is noted in bilateral carotid bulbs and proximal   internal carotid arteries without evidence of a flow-limiting stenosis. 3. Unremarkable CTA of the brain. 4. Dental caries with periodontal disease. MRI brain w/o contrast:  Impression   1. No acute intracranial abnormality. No acute infarction.    2. Mild microangiopathic change. Routine EEG:  EEG Interpretation: This EEG was within normal limits for a patient of this age in the awake and drowsy states. No focal, lateralizing, or epileptiform features were seen during the recording. All imaging was personally reviewed     ASSESSMENT/PLAN:   82-year-old male presenting with left arm spasm, jerking and weakness with sensory change. Symptoms of been ongoing for several years but progressed significantly over the last day. Patient is lying in bed awake, alert and oriented x4. He states that he is feeling much better and has not had any episodes of spasm of the hand or jerking of the left upper extremity so far today. He denies any headache, vision change, new weakness or sensory change. left upper extremity weakness, spasm, jerking concerning for acute stroke superimposed on hyperglycemia, hypertension  Neuroimaging as above  MRI brain as above nonacute  CTA head and neck notes severe right vertebral artery stenosis, mild left vertebral artery stenosis, plaques within bilateral carotid bulbs without evidence of flow-limiting stenosis  Routine EEG has been ordered to assess for possible focal seizures-pending final read  Initial glucose 878, now improved in the 150s-160s management per IM  A1c  >16.5  TSH WNL  Continue rosuvastatin  LDL 62  Will initiate antiplaelet pending MRI results  Recommend SBP <140  PT/OT as recommended  Further recommendations pending EEG results    Patient was discussed with attending neurologist Dr. Aime Blunt      Thank you for allowing us to participate in the care of your patient. If there are any questions regarding evaluation please feel free to contact us. Hailey Hastings, AMADO - KAVON, 1/26/2023       ------------------------------------    Attending Note:  I have rounded on this patient with Ronda Unger CNP. I have reviewed the chart and we have discussed this case in detail. The patient was seen and examined by myself.  Pertinent labs and imaging have been personally reviewed. Our findings and impressions were discussed with the patient. I concur with the Nurse Practioner's assessment and plan. Was having jerking and spasming of the left upper extremity. This seems to have resolved. Still display some dysdiadochokinesia on the left upper extremity. Could certainly be related to hypoglycemia. An MRI of the brain is pending at this time to rule out stroke. Focal seizures in the differential diagnosis as well. We will order an EEG.     AMADO Rodriguez CNP 1/26/2023 9:35 AM

## 2023-01-26 NOTE — CARE COORDINATION
Met c pt to discuss discharge disposition, pt states he was not aware he was discharged last evening, he does not know why he was kept. Confirmed pt had medications, follow up for pcp/ endocrinology, pt knows to go to [address] [place] [address] for additional meds and pt has a ride home. Updated pt's nurse that pt ready for discharge, she too was unaware as to why pt did not go home last evening.

## 2023-01-28 LAB
CULTURE: NORMAL
CULTURE: NORMAL
Lab: NORMAL
Lab: NORMAL
SPECIMEN: NORMAL
SPECIMEN: NORMAL